# Patient Record
Sex: MALE | Race: WHITE | NOT HISPANIC OR LATINO | Employment: FULL TIME | URBAN - METROPOLITAN AREA
[De-identification: names, ages, dates, MRNs, and addresses within clinical notes are randomized per-mention and may not be internally consistent; named-entity substitution may affect disease eponyms.]

---

## 2023-10-09 ENCOUNTER — OFFICE VISIT (OUTPATIENT)
Dept: PRIMARY CARE | Facility: CLINIC | Age: 28
End: 2023-10-09
Payer: OTHER GOVERNMENT

## 2023-10-09 VITALS
RESPIRATION RATE: 16 BRPM | TEMPERATURE: 98.4 F | HEART RATE: 81 BPM | BODY MASS INDEX: 22.9 KG/M2 | OXYGEN SATURATION: 94 % | DIASTOLIC BLOOD PRESSURE: 75 MMHG | WEIGHT: 151.13 LBS | HEIGHT: 68 IN | SYSTOLIC BLOOD PRESSURE: 134 MMHG

## 2023-10-09 DIAGNOSIS — M62.830 LUMBAR PARASPINAL MUSCLE SPASM: ICD-10-CM

## 2023-10-09 DIAGNOSIS — Z11.4 ENCOUNTER FOR SCREENING FOR HIV: ICD-10-CM

## 2023-10-09 DIAGNOSIS — Z00.00 HEALTHCARE MAINTENANCE: Primary | ICD-10-CM

## 2023-10-09 DIAGNOSIS — Z11.59 ENCOUNTER FOR HEPATITIS C SCREENING TEST FOR LOW RISK PATIENT: ICD-10-CM

## 2023-10-09 PROBLEM — R42 VERTIGO: Status: ACTIVE | Noted: 2017-04-08

## 2023-10-09 PROBLEM — R20.9 SENSATION OF COLD IN FINGER: Status: ACTIVE | Noted: 2023-04-08

## 2023-10-09 PROCEDURE — 99204 OFFICE O/P NEW MOD 45 MIN: CPT | Performed by: STUDENT IN AN ORGANIZED HEALTH CARE EDUCATION/TRAINING PROGRAM

## 2023-10-09 PROCEDURE — 98926 OSTEOPATH MANJ 3-4 REGIONS: CPT | Performed by: STUDENT IN AN ORGANIZED HEALTH CARE EDUCATION/TRAINING PROGRAM

## 2023-10-09 PROCEDURE — 1036F TOBACCO NON-USER: CPT | Performed by: STUDENT IN AN ORGANIZED HEALTH CARE EDUCATION/TRAINING PROGRAM

## 2023-10-09 ASSESSMENT — ENCOUNTER SYMPTOMS
DEPRESSION: 0
LOSS OF SENSATION IN FEET: 0
OCCASIONAL FEELINGS OF UNSTEADINESS: 0

## 2023-10-09 NOTE — PROGRESS NOTES
Subjective   Pascual Márquez is a 28 y.o. male who presents for Vertigo and Spasms (NP here today to establish and to discuss vertigo and back spasms.).  In the [employer] - Traveled throughout [place].  For approx 10 years  Interested in chiropractics after  Moved with [place]    Vertigo - started with depth work in a pool.  Started 2017 - increasing off balanced sensation - with ringing of the ears or head movements  2-10 sec each time  Improved with head stabilization techniques    Back Spasms -intermittently over the past 4 years after having stabbing while running, standing until.  He has had several, exam manipulations with some success.  He does stretching regularly.  He is physically active.   Intermittently takes anti-inflammatories as needed.  He has not gone through formal physical therapy for this.      Patient also states he has some cold sensations in 1 dividual digit, worse with temperature changes.  No numbness, tingling, weakness or motor changes associated.                Review of Systems   Constitutional:  Negative for appetite change, fatigue and fever.   HENT:  Negative for ear discharge, ear pain, hearing loss, postnasal drip, rhinorrhea and sinus pain.    Eyes:  Negative for visual disturbance.   Respiratory:  Negative for shortness of breath.    Cardiovascular:  Negative for chest pain, palpitations and leg swelling.   Gastrointestinal:  Negative for abdominal pain, blood in stool, constipation, diarrhea, nausea and vomiting.   Genitourinary:  Negative for flank pain and hematuria.   Musculoskeletal:  Negative for arthralgias and myalgias.   Skin:  Negative for rash.   Neurological:  Negative for dizziness and light-headedness.   All other systems reviewed and are negative.      Objective   Physical Exam  Vitals reviewed.   Constitutional:       General: He is not in acute distress.     Appearance: Normal appearance. He is normal weight. He is not toxic-appearing.   HENT:      Head:  Normocephalic and atraumatic.      Right Ear: Tympanic membrane and ear canal normal.      Left Ear: Tympanic membrane and ear canal normal.      Nose: Nose normal. No congestion or rhinorrhea.      Mouth/Throat:      Mouth: Mucous membranes are dry.   Eyes:      General: No scleral icterus.     Extraocular Movements: Extraocular movements intact.      Conjunctiva/sclera: Conjunctivae normal.      Pupils: Pupils are equal, round, and reactive to light.   Cardiovascular:      Rate and Rhythm: Normal rate and regular rhythm.      Heart sounds: No murmur heard.     No friction rub. No gallop.   Pulmonary:      Effort: Pulmonary effort is normal. No respiratory distress.      Breath sounds: Normal breath sounds. No wheezing, rhonchi or rales.   Abdominal:      General: Abdomen is flat. There is no distension.      Palpations: Abdomen is soft.      Tenderness: There is no abdominal tenderness. There is no guarding.   Musculoskeletal:         General: Normal range of motion.      Cervical back: Normal range of motion and neck supple.      Right lower leg: No edema.      Left lower leg: No edema.      Comments: Significant limited range of motion in hamstrings, hips, and lumbar spine   Lymphadenopathy:      Cervical: No cervical adenopathy.   Skin:     General: Skin is warm and dry.   Neurological:      General: No focal deficit present.      Mental Status: He is alert and oriented to person, place, and time.   Psychiatric:         Mood and Affect: Mood normal.         Behavior: Behavior normal.         Assessment/Plan   Problem List Items Addressed This Visit    None  Visit Diagnoses       Healthcare maintenance    -  Primary    Relevant Orders    Comprehensive Metabolic Panel    CBC    Lipid Panel    Encounter for hepatitis C screening test for low risk patient        Relevant Orders    Hepatitis C Antibody    Encounter for screening for HIV        Relevant Orders    HIV 1/2 Antigen/Antibody Screen with Reflex to  Confirmation          Patient seen today to establish care    We will get baseline lab work.    Overall lives at the lifestyle, lives with fiancé.    For muscle spasms of the low back performed Acimethin manipulation, this included lumbar spine, sacrum, pelvis, hamstrings, bilaterally.    Osteopathic manipulation of the above areas included muscle energy, stills technique.  Tolerated well, significant improvement flexibility following.    Due to patient's vertigo, given handout on home Epley maneuver.    Follow-up as new concerns arise.

## 2023-10-10 ASSESSMENT — ENCOUNTER SYMPTOMS
LIGHT-HEADEDNESS: 0
DIARRHEA: 0
FLANK PAIN: 0
BLOOD IN STOOL: 0
MYALGIAS: 0
DIZZINESS: 0
NAUSEA: 0
PALPITATIONS: 0
ARTHRALGIAS: 0
ABDOMINAL PAIN: 0
SINUS PAIN: 0
FEVER: 0
SHORTNESS OF BREATH: 0
HEMATURIA: 0
CONSTIPATION: 0
VOMITING: 0
RHINORRHEA: 0
FATIGUE: 0
APPETITE CHANGE: 0

## 2023-10-16 ENCOUNTER — LAB (OUTPATIENT)
Dept: LAB | Facility: LAB | Age: 28
End: 2023-10-16
Payer: OTHER GOVERNMENT

## 2023-10-16 DIAGNOSIS — Z11.4 ENCOUNTER FOR SCREENING FOR HIV: ICD-10-CM

## 2023-10-16 DIAGNOSIS — Z00.00 HEALTHCARE MAINTENANCE: ICD-10-CM

## 2023-10-16 DIAGNOSIS — Z11.59 ENCOUNTER FOR HEPATITIS C SCREENING TEST FOR LOW RISK PATIENT: ICD-10-CM

## 2023-10-16 LAB
ALBUMIN SERPL BCP-MCNC: 4.9 G/DL (ref 3.4–5)
ALP SERPL-CCNC: 34 U/L (ref 33–120)
ALT SERPL W P-5'-P-CCNC: 13 U/L (ref 10–52)
ANION GAP SERPL CALC-SCNC: 9 MMOL/L (ref 10–20)
AST SERPL W P-5'-P-CCNC: 16 U/L (ref 9–39)
BILIRUB SERPL-MCNC: 1.6 MG/DL (ref 0–1.2)
BUN SERPL-MCNC: 18 MG/DL (ref 6–23)
CALCIUM SERPL-MCNC: 10 MG/DL (ref 8.6–10.3)
CHLORIDE SERPL-SCNC: 101 MMOL/L (ref 98–107)
CHOLEST SERPL-MCNC: 139 MG/DL (ref 0–199)
CHOLESTEROL/HDL RATIO: 2.9
CO2 SERPL-SCNC: 32 MMOL/L (ref 21–32)
CREAT SERPL-MCNC: 1.09 MG/DL (ref 0.5–1.3)
ERYTHROCYTE [DISTWIDTH] IN BLOOD BY AUTOMATED COUNT: 12.4 % (ref 11.5–14.5)
GFR SERPL CREATININE-BSD FRML MDRD: >90 ML/MIN/1.73M*2
GLUCOSE SERPL-MCNC: 86 MG/DL (ref 74–99)
HCT VFR BLD AUTO: 44.9 % (ref 41–52)
HDLC SERPL-MCNC: 47.9 MG/DL
HGB BLD-MCNC: 15 G/DL (ref 13.5–17.5)
HIV 1+2 AB+HIV1 P24 AG SERPL QL IA: NONREACTIVE
LDLC SERPL CALC-MCNC: 82 MG/DL
MCH RBC QN AUTO: 29 PG (ref 26–34)
MCHC RBC AUTO-ENTMCNC: 33.4 G/DL (ref 32–36)
MCV RBC AUTO: 87 FL (ref 80–100)
NON HDL CHOLESTEROL: 91 MG/DL (ref 0–149)
NRBC BLD-RTO: 0 /100 WBCS (ref 0–0)
PLATELET # BLD AUTO: 264 X10*3/UL (ref 150–450)
PMV BLD AUTO: 10.8 FL (ref 7.5–11.5)
POTASSIUM SERPL-SCNC: 4.6 MMOL/L (ref 3.5–5.3)
PROT SERPL-MCNC: 7.8 G/DL (ref 6.4–8.2)
RBC # BLD AUTO: 5.17 X10*6/UL (ref 4.5–5.9)
SODIUM SERPL-SCNC: 137 MMOL/L (ref 136–145)
TRIGL SERPL-MCNC: 48 MG/DL (ref 0–149)
VLDL: 10 MG/DL (ref 0–40)
WBC # BLD AUTO: 5.2 X10*3/UL (ref 4.4–11.3)

## 2023-10-16 PROCEDURE — 36415 COLL VENOUS BLD VENIPUNCTURE: CPT

## 2023-10-16 PROCEDURE — 80061 LIPID PANEL: CPT

## 2023-10-16 PROCEDURE — 86803 HEPATITIS C AB TEST: CPT

## 2023-10-16 PROCEDURE — 85027 COMPLETE CBC AUTOMATED: CPT

## 2023-10-16 PROCEDURE — 80053 COMPREHEN METABOLIC PANEL: CPT

## 2023-10-16 PROCEDURE — 87389 HIV-1 AG W/HIV-1&-2 AB AG IA: CPT

## 2023-10-17 LAB — HCV AB SER QL: NONREACTIVE

## 2023-11-15 ENCOUNTER — LAB (OUTPATIENT)
Dept: LAB | Facility: LAB | Age: 28
End: 2023-11-15
Payer: OTHER GOVERNMENT

## 2023-11-15 ENCOUNTER — OFFICE VISIT (OUTPATIENT)
Dept: PRIMARY CARE | Facility: CLINIC | Age: 28
End: 2023-11-15
Payer: OTHER GOVERNMENT

## 2023-11-15 VITALS
WEIGHT: 150 LBS | TEMPERATURE: 98.6 F | SYSTOLIC BLOOD PRESSURE: 121 MMHG | HEIGHT: 68 IN | RESPIRATION RATE: 18 BRPM | BODY MASS INDEX: 22.73 KG/M2 | DIASTOLIC BLOOD PRESSURE: 74 MMHG | OXYGEN SATURATION: 96 % | HEART RATE: 79 BPM

## 2023-11-15 DIAGNOSIS — Z13.88 SCREENING FOR LEAD EXPOSURE: Primary | ICD-10-CM

## 2023-11-15 DIAGNOSIS — I73.00 RAYNAUD'S PHENOMENON WITHOUT GANGRENE: ICD-10-CM

## 2023-11-15 DIAGNOSIS — Z13.88 SCREENING FOR LEAD EXPOSURE: ICD-10-CM

## 2023-11-15 DIAGNOSIS — R20.9 SENSATION OF COLD IN FINGER: ICD-10-CM

## 2023-11-15 LAB
APPEARANCE UR: CLEAR
BILIRUB UR STRIP.AUTO-MCNC: NEGATIVE MG/DL
COLOR UR: YELLOW
GLUCOSE UR STRIP.AUTO-MCNC: NEGATIVE MG/DL
KETONES UR STRIP.AUTO-MCNC: NEGATIVE MG/DL
LEUKOCYTE ESTERASE UR QL STRIP.AUTO: NEGATIVE
NITRITE UR QL STRIP.AUTO: NEGATIVE
PH UR STRIP.AUTO: 7 [PH]
PROT UR STRIP.AUTO-MCNC: NEGATIVE MG/DL
RBC # UR STRIP.AUTO: NEGATIVE /UL
SP GR UR STRIP.AUTO: 1.02
UROBILINOGEN UR STRIP.AUTO-MCNC: <2 MG/DL

## 2023-11-15 PROCEDURE — 99213 OFFICE O/P EST LOW 20 MIN: CPT

## 2023-11-15 PROCEDURE — 84202 ASSAY RBC PROTOPORPHYRIN: CPT

## 2023-11-15 PROCEDURE — 1036F TOBACCO NON-USER: CPT

## 2023-11-15 PROCEDURE — 81003 URINALYSIS AUTO W/O SCOPE: CPT

## 2023-11-15 PROCEDURE — 36415 COLL VENOUS BLD VENIPUNCTURE: CPT

## 2023-11-15 PROCEDURE — 83655 ASSAY OF LEAD: CPT

## 2023-11-15 ASSESSMENT — ENCOUNTER SYMPTOMS
COLOR CHANGE: 1
VOMITING: 0
DIZZINESS: 0
LIGHT-HEADEDNESS: 0
FEVER: 0
NAUSEA: 0
SHORTNESS OF BREATH: 0
NUMBNESS: 1

## 2023-11-15 NOTE — PROGRESS NOTES
Subjective   Pascual Márquez is a 28 y.o. male who presents for Numbness (He has left index finger numbness that has been happening x 1 year. Recently the finger became numb and became white and there was a significant temperature difference with that finger and the other fingers.).  Pt has noticed numbness and cooling of his index finger. He was told as long as there is no change in color not to worry but he noticed complete pallor of the affected digit. This was accompanied with numbness. He has had this going on for about 1 year. 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 Denies any radiation of the numbness. The pallor is not radiating any further than his DIP/PIP.     Pt also is requiring blood test for lead exposure through work since he is around lead based equipment a lot through the [employer]. They are requiring blood lead, ZPP, CBC, BUN/Cr, UA with microscopic. CBC and BUN/Cr checked last month during physical. Will order blodo lead, UA with microscopic and ZPP    Neuropathy          Review of Systems   Constitutional:  Negative for fever.   Respiratory:  Negative for shortness of breath.    Cardiovascular:  Negative for chest pain.   Gastrointestinal:  Negative for nausea and vomiting.   Skin:  Positive for color change and pallor.   Neurological:  Positive for numbness. Negative for dizziness and light-headedness.   All other systems reviewed and are negative.      Objective   Physical Exam  Vitals reviewed.   Constitutional:       General: He is not in acute distress.     Appearance: Normal appearance. He is not toxic-appearing.   HENT:      Head: Normocephalic and atraumatic.      Nose: Nose normal.   Eyes:      Extraocular Movements: Extraocular movements intact.   Cardiovascular:      Rate and Rhythm: Normal rate.      Heart sounds: No murmur heard.     No friction rub. No gallop.   Pulmonary:       "Effort: Pulmonary effort is normal.   Skin:     General: Skin is warm and dry.      Coloration: Skin is pale.      Comments: Poor capillary refill appreciated of L index finger, sensation intact   Neurological:      General: No focal deficit present.      Mental Status: He is alert.   Psychiatric:         Mood and Affect: Mood normal.         Behavior: Behavior normal.         Assessment/Plan   Problem List Items Addressed This Visit             ICD-10-CM    Sensation of cold in finger R20.9     Pt presenting for continued feeling of numbness, cold, pallor in L index finger. Pt noticed that finger gets pale and hurts when \"blood returns\". 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 Pt agreeable to doppler at this time as source is likely vascular. Unusual for raynaud's to present in one single digit however remains on ddx. Pt to follow up for results. Can consider CCB if needed. Encouraged to exercise with hand motions and can trial raynaud's gloves.          Screening for lead exposure - Primary Z13.88     Pt req lead screening for work. CBC and BUN/Cr already done at last appointment. Urine with microscopic, lead blood, and ZPP ordered today.         Relevant Orders    Lead, blood    Zinc Protoporphyrin (ZPP)    Urinalysis with Reflex Microscopic     Other Visit Diagnoses         Codes    Raynaud's phenomenon without gangrene     I73.00    Relevant Orders    Vascular US upper extremity arterial duplex left               "

## 2023-11-15 NOTE — ASSESSMENT & PLAN NOTE
Pt req lead screening for work. CBC and BUN/Cr already done at last appointment. Urine with microscopic, lead blood, and ZPP ordered today.

## 2023-11-15 NOTE — ASSESSMENT & PLAN NOTE
"Pt presenting for continued feeling of numbness, cold, pallor in L index finger. Pt noticed that finger gets pale and hurts when \"blood returns\". 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 Pt agreeable to doppler at this time as source is likely vascular. Unusual for raynaud's to present in one single digit however remains on ddx. Pt to follow up for results. Can consider CCB if needed. Encouraged to exercise with hand motions and can trial raynaud's gloves.   "

## 2023-11-16 LAB — LEAD BLD-MCNC: <0.5 UG/DL

## 2023-11-17 NOTE — PROGRESS NOTES
I saw and evaluated the patient. I personally obtained the key and critical portions of the history and physical exam or was physically present for key and critical portions performed by the resident/fellow. I reviewed the resident/fellow's documentation and discussed the patient with the resident/fellow. I agree with the resident/fellow's medical decision making as documented in the note.    Torres Solomon, DO

## 2023-11-20 LAB — ZPP RBC-MCNC: 20 UG/DL (ref 0–36)

## 2023-11-21 ENCOUNTER — TELEPHONE (OUTPATIENT)
Dept: PRIMARY CARE | Facility: CLINIC | Age: 28
End: 2023-11-21
Payer: OTHER GOVERNMENT

## 2023-11-21 DIAGNOSIS — R20.9 SENSATION OF COLD IN FINGER: Primary | ICD-10-CM

## 2023-11-28 ENCOUNTER — APPOINTMENT (OUTPATIENT)
Dept: RADIOLOGY | Facility: CLINIC | Age: 28
End: 2023-11-28
Payer: OTHER GOVERNMENT

## 2023-12-07 ENCOUNTER — HOSPITAL ENCOUNTER (OUTPATIENT)
Dept: VASCULAR MEDICINE | Facility: CLINIC | Age: 28
Discharge: HOME | End: 2023-12-07
Payer: OTHER GOVERNMENT

## 2023-12-07 DIAGNOSIS — R09.89 OTHER SPECIFIED SYMPTOMS AND SIGNS INVOLVING THE CIRCULATORY AND RESPIRATORY SYSTEMS: ICD-10-CM

## 2023-12-07 DIAGNOSIS — R20.9 SENSATION OF COLD IN FINGER: ICD-10-CM

## 2023-12-07 PROCEDURE — 93922 UPR/L XTREMITY ART 2 LEVELS: CPT | Performed by: SURGERY

## 2023-12-07 PROCEDURE — 93930 UPPER EXTREMITY STUDY: CPT | Performed by: SURGERY

## 2023-12-07 PROCEDURE — 93922 UPR/L XTREMITY ART 2 LEVELS: CPT

## 2023-12-08 DIAGNOSIS — I73.00 RAYNAUD'S PHENOMENON WITHOUT GANGRENE: Primary | ICD-10-CM

## 2023-12-12 ENCOUNTER — TELEPHONE (OUTPATIENT)
Dept: PRIMARY CARE | Facility: CLINIC | Age: 28
End: 2023-12-12
Payer: OTHER GOVERNMENT

## 2023-12-29 PROBLEM — M54.50 LOW BACK PAIN: Status: ACTIVE | Noted: 2018-01-13

## 2024-01-05 ENCOUNTER — TELEPHONE (OUTPATIENT)
Dept: PRIMARY CARE | Facility: CLINIC | Age: 29
End: 2024-01-05
Payer: OTHER GOVERNMENT

## 2024-01-05 DIAGNOSIS — H16.209 KERATOCONJUNCTIVITIS, UNSPECIFIED LATERALITY: Primary | ICD-10-CM

## 2024-01-17 ENCOUNTER — OFFICE VISIT (OUTPATIENT)
Dept: VASCULAR SURGERY | Facility: CLINIC | Age: 29
End: 2024-01-17
Payer: OTHER GOVERNMENT

## 2024-01-17 VITALS
BODY MASS INDEX: 22.73 KG/M2 | WEIGHT: 150 LBS | DIASTOLIC BLOOD PRESSURE: 70 MMHG | SYSTOLIC BLOOD PRESSURE: 120 MMHG | HEIGHT: 68 IN | HEART RATE: 74 BPM

## 2024-01-17 DIAGNOSIS — I73.00 RAYNAUD'S SYNDROME WITHOUT GANGRENE: Primary | ICD-10-CM

## 2024-01-17 DIAGNOSIS — I73.00 RAYNAUD'S PHENOMENON WITHOUT GANGRENE: ICD-10-CM

## 2024-01-17 PROCEDURE — 99203 OFFICE O/P NEW LOW 30 MIN: CPT | Performed by: SURGERY

## 2024-01-17 PROCEDURE — 1036F TOBACCO NON-USER: CPT | Performed by: SURGERY

## 2024-01-17 RX ORDER — AMLODIPINE BESYLATE 5 MG/1
5 TABLET ORAL DAILY
Qty: 30 TABLET | Refills: 3 | Status: SHIPPED | OUTPATIENT
Start: 2024-01-17 | End: 2024-02-27 | Stop reason: ALTCHOICE

## 2024-01-17 ASSESSMENT — ENCOUNTER SYMPTOMS
RESPIRATORY NEGATIVE: 1
NUMBNESS: 1
ALLERGIC/IMMUNOLOGIC NEGATIVE: 1
GASTROINTESTINAL NEGATIVE: 1
COLOR CHANGE: 1
PSYCHIATRIC NEGATIVE: 1
ENDOCRINE NEGATIVE: 1
CONSTITUTIONAL NEGATIVE: 1
MUSCULOSKELETAL NEGATIVE: 1
CARDIOVASCULAR NEGATIVE: 1
HEMATOLOGIC/LYMPHATIC NEGATIVE: 1

## 2024-01-17 NOTE — PROGRESS NOTES
History Of Present Illness  Pascual Márquez is a 28 y.o. male presenting with complaint of discoloration, pain, numbness and tingling of fingers bilaterally.  He states this has been a problem for over a year.  He does notice that it gets worse in colder temperatures.  He states the only things that helps are warming up his hands.  He was evaluated by his PCP and sent for Doppler studies.  This does reveal findings consistent with Raynaud's syndrome with a decrease in waveforms with cold immersion.     Past Medical History  He has no past medical history on file.    Surgical History  He has no past surgical history on file.     Social History  He reports that he has never smoked. He has never used smokeless tobacco. No history on file for alcohol use and drug use.    Family History  No family history on file.     Allergies  Patient has no known allergies.    Review of Systems   Constitutional: Negative.    HENT: Negative.     Respiratory: Negative.     Cardiovascular: Negative.    Gastrointestinal: Negative.    Endocrine: Negative.    Genitourinary: Negative.    Musculoskeletal: Negative.    Skin:  Positive for color change.   Allergic/Immunologic: Negative.    Neurological:  Positive for numbness.   Hematological: Negative.    Psychiatric/Behavioral: Negative.          Physical Exam  Vitals reviewed.   Constitutional:       General: He is not in acute distress.     Appearance: Normal appearance. He is normal weight.   HENT:      Head: Normocephalic and atraumatic.   Eyes:      Extraocular Movements: Extraocular movements intact.      Conjunctiva/sclera: Conjunctivae normal.      Pupils: Pupils are equal, round, and reactive to light.   Neck:      Vascular: No carotid bruit.   Cardiovascular:      Rate and Rhythm: Normal rate and regular rhythm.      Pulses: Normal pulses.           Radial pulses are 2+ on the right side and 2+ on the left side.      Heart sounds: Normal heart sounds.      Comments: Triphasic  "signals noted in radial, ulnar, and palmar arch bilaterally  Pulmonary:      Effort: Pulmonary effort is normal.      Breath sounds: Normal breath sounds.   Abdominal:      General: Abdomen is flat. Bowel sounds are normal.      Palpations: Abdomen is soft.   Musculoskeletal:         General: No swelling or tenderness. Normal range of motion.      Cervical back: Normal range of motion and neck supple. No tenderness.   Skin:     General: Skin is warm and dry.      Capillary Refill: Capillary refill takes 2 to 3 seconds.      Comments: Slightly delayed capillary refill in fingers bilaterally   Neurological:      General: No focal deficit present.      Mental Status: He is alert and oriented to person, place, and time.      Cranial Nerves: No cranial nerve deficit.      Sensory: No sensory deficit.      Motor: No weakness.   Psychiatric:         Mood and Affect: Mood normal.         Behavior: Behavior normal.          Last Recorded Vitals  Blood pressure 120/70, pulse 74, height 1.727 m (5' 8\"), weight 68 kg (150 lb).    Relevant Results      Current Outpatient Medications:     amLODIPine (Norvasc) 5 mg tablet, Take 1 tablet (5 mg) by mouth once daily., Disp: 30 tablet, Rfl: 3           Assessment/Plan   Diagnoses and all orders for this visit:  Raynaud's syndrome without gangrene  -     amLODIPine (Norvasc) 5 mg tablet; Take 1 tablet (5 mg) by mouth once daily.  -     Referral to Vascular Medicine; Future  Raynaud's phenomenon without gangrene  -     Referral to Vascular Surgery      27yo male with Raynaud's syndrome of bilateral upper extremities.  I have started him on amlodipine 5 mg daily to help with symptoms.  I have also recommended that he continue to keep his hands warm and minimize his exposure to cold.  I have referred him to Dr. Waldron of vascular medicine for further evaluation and treatment.       I spent 30 minutes in the professional and overall care of this patient.      Estefania White MD   "

## 2024-02-14 ENCOUNTER — CONSULT (OUTPATIENT)
Dept: CARDIOLOGY | Facility: CLINIC | Age: 29
End: 2024-02-14
Payer: OTHER GOVERNMENT

## 2024-02-14 VITALS
HEIGHT: 68 IN | BODY MASS INDEX: 23.04 KG/M2 | OXYGEN SATURATION: 97 % | DIASTOLIC BLOOD PRESSURE: 62 MMHG | HEART RATE: 88 BPM | WEIGHT: 152 LBS | SYSTOLIC BLOOD PRESSURE: 142 MMHG

## 2024-02-14 DIAGNOSIS — I73.9: Primary | ICD-10-CM

## 2024-02-14 DIAGNOSIS — I73.00 RAYNAUD'S SYNDROME WITHOUT GANGRENE: ICD-10-CM

## 2024-02-14 PROCEDURE — 99203 OFFICE O/P NEW LOW 30 MIN: CPT | Performed by: INTERNAL MEDICINE

## 2024-02-14 RX ORDER — AMLODIPINE BESYLATE 2.5 MG/1
2.5 TABLET ORAL DAILY
Qty: 30 TABLET | Refills: 11 | Status: SHIPPED | OUTPATIENT
Start: 2024-02-14 | End: 2025-02-13

## 2024-02-14 ASSESSMENT — ENCOUNTER SYMPTOMS
CONSTITUTIONAL NEGATIVE: 1
CARDIOVASCULAR NEGATIVE: 1
HEMATOLOGIC/LYMPHATIC NEGATIVE: 1
MUSCULOSKELETAL NEGATIVE: 1
GASTROINTESTINAL NEGATIVE: 1
RESPIRATORY NEGATIVE: 1
COLOR CHANGE: 1
NUMBNESS: 1
PSYCHIATRIC NEGATIVE: 1
EYES NEGATIVE: 1

## 2024-02-14 NOTE — PROGRESS NOTES
"Subjective   Patient ID: Pascual Márquez is a 29 y.o. male who presents for NPV    HPI   28 y.o. male presenting with complaint of discoloration, pain, numbness and tingling of fingers bilaterally.  He states this has been a problem for over a year.  He does notice that it gets worse in colder temperatures.  He states the only things that helps are warming up his hands.  He was evaluated by his PCP and sent for Doppler studies.  This does reveal findings consistent with Raynaud's syndrome with a decrease in waveforms with cold immersion.   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 His symptoms are primarily pronounced in one finger that he uses to operate the weapon. Patient and family recently moved from [place] to [place] and his sx have been worsening in colder temperatures here.   Patient was started on Norvasc 5mg by Dr White which he has not started taking because of concerns regarding side effects.   No fhx of Raynaud's. He is a non-smoker, drinks alcohol only occasionally.     Review of Systems   Constitutional: Negative.    HENT: Negative.     Eyes: Negative.    Respiratory: Negative.     Cardiovascular: Negative.    Gastrointestinal: Negative.    Musculoskeletal: Negative.    Skin:  Positive for color change.   Neurological:  Positive for numbness.   Hematological: Negative.    Psychiatric/Behavioral: Negative.     All other systems reviewed and are negative.      Objective   /62 (BP Location: Right arm, Patient Position: Sitting)   Pulse 88   Ht 1.727 m (5' 8\")   Wt 68.9 kg (152 lb)   SpO2 97%   BMI 23.11 kg/m²     Physical Exam  Vitals reviewed.   Constitutional:       Appearance: Normal appearance.   HENT:      Head: Normocephalic and atraumatic.      Nose: Nose normal.   Cardiovascular:      Rate and Rhythm: Normal rate and regular rhythm.   Pulmonary:      Effort: Pulmonary effort is normal.      Breath sounds: Normal " "breath sounds.   Musculoskeletal:         General: Normal range of motion.   Skin:     Capillary Refill: Capillary refill takes 2 to 3 seconds.   Neurological:      General: No focal deficit present.      Mental Status: He is alert and oriented to person, place, and time.   Psychiatric:         Mood and Affect: Mood normal.         Behavior: Behavior normal.         Assessment/Plan   Diagnoses and all orders for this visit:  Peripheral artery vasospasm (CMS/HCC)  -     amLODIPine (Norvasc) 2.5 mg tablet; Take 1 tablet (2.5 mg) by mouth once daily.  Raynaud's syndrome without gangrene  -     Referral to Vascular Medicine     29yo male with Raynaud's syndrome of bilateral upper extremities. He presents with complaint of discoloration, pain, numbness and tingling of fingers bilaterally. Patient frequently uses high powered weapons such as pistols and rifles involving using the trigger repeatedly for his work in law enforcement and his symptoms are consistent with traumatic vasospastic disease leading to \"white finger syndrome\" in the left hand index finger.     1- Avoid exposure to cold temperatures and refrain from handling high powdered weapons as this could worsen or trigger symptoms  2- Take Norvasc 2.5mg p.o nightly  3- follow up prn for assessment    Scribe Attestation  By signing my name below, IDevi , Scribe   attest that this documentation has been prepared under the direction and in the presence of Clifford Waldron MD.      "

## 2024-02-23 ENCOUNTER — OFFICE VISIT (OUTPATIENT)
Dept: OPHTHALMOLOGY | Facility: CLINIC | Age: 29
End: 2024-02-23
Payer: OTHER GOVERNMENT

## 2024-02-23 DIAGNOSIS — H18.603 KERATOCONUS OF BOTH EYES: Primary | ICD-10-CM

## 2024-02-23 DIAGNOSIS — G47.33 OSA (OBSTRUCTIVE SLEEP APNEA): ICD-10-CM

## 2024-02-23 LAB
KMAX (OD): 61.4 DIOPTERS
KMAX (OS): 47.7 DIOPTERS
PACH THINNEST (OD): 414 MICRONS
PACH THINNEST (OS): 473 MICRONS
SIMK FLAT (OD): 47 DIOPTERS
SIMK FLAT (OS): 44.8 DIOPTERS
SIMK STEEP (OD): 53 DIOPTERS
SIMK STEEP (OS): 46.4 DIOPTERS

## 2024-02-23 PROCEDURE — 92134 CPTRZ OPH DX IMG PST SGM RTA: CPT | Performed by: OPHTHALMOLOGY

## 2024-02-23 PROCEDURE — 99203 OFFICE O/P NEW LOW 30 MIN: CPT | Performed by: OPHTHALMOLOGY

## 2024-02-23 PROCEDURE — 92025 CPTRIZED CORNEAL TOPOGRAPHY: CPT | Performed by: OPHTHALMOLOGY

## 2024-02-23 ASSESSMENT — REFRACTION_MANIFEST
OD_SPHERE: -1.25
OD_AXIS: 180
OD_AXIS: 180
OS_SPHERE: +0.75
OD_CYLINDER: -5.50
OS_AXIS: 025
OS_CYLINDER: -1.75
OS_CYLINDER: -1.50
OS_AXIS: 025
OD_CYLINDER: -5.50
OD_SPHERE: -1.75
OS_SPHERE: +0.75

## 2024-02-23 ASSESSMENT — VISUAL ACUITY
OS_CC: 20/20-1
OD_PH_CC: 20/20-1
METHOD: SNELLEN - LINEAR
OD_CC: 20/30+2

## 2024-02-23 ASSESSMENT — REFRACTION_WEARINGRX
OS_SPHERE: +0.50
OS_CYLINDER: -0.25
OS_AXIS: 014
OD_CYLINDER: -2.75
OD_SPHERE: PLANO
OD_AXIS: 047

## 2024-02-23 ASSESSMENT — TONOMETRY
IOP_METHOD: GOLDMANN APPLANATION
OS_IOP_MMHG: 12
OD_IOP_MMHG: 12

## 2024-02-23 NOTE — PROGRESS NOTES
Assessment/Plan   Diagnoses and all orders for this visit:  Keratoconus of both eyes  -Discussed the risk of progression and counseled against eye rubbing as this may lead to progression  -Discused corneal cross linking as a measure to prevent further progression of KCN  -Discussed the postop course, fluctuation of vision and the need for vision correction after CXL best with CL  -Discussed r/b/a of CXL including infection and haze.   -The patient understands and wishes to proceed  - Explained to the patient at length how CXL is important to decrease the risk of needing a corneal transplant later in life  - Explained that CXL will not improve vision  - Answered all pt questions  -Will schedule CXL (right eye)  - Will monitor OS for now  -     Corneal Topography - OU - Both Eyes  SCOT (obstructive sleep apnea)  -     Referral to Adult Sleep Medicine; Future

## 2024-02-23 NOTE — LETTER
February 23, 2024    Torres Solomon DO  [address]  [facility]  [facility] [address]    Patient: Pascual Márquez   YOB: 1995   Date of Visit: 2/23/2024       Dear Dr. Torres Solomon DO:    Thank you for referring Pascual Márquez to me for evaluation. Below are the relevant portions of the exam, along with my assessment and plan of care.    Vision readings for this visit:  Visual Acuity (Snellen - Linear)         Right Left    Dist cc 20/30+2 20/20-1    Dist ph cc 20/20-1           Tonometry (Goldmann Applanation, 9:51 AM)         Right Left    Pressure 12 12            Assessment/Plan:  Diagnoses and all orders for this visit:  Keratoconus of both eyes  -Discussed the risk of progression and counseled against eye rubbing as this may lead to progression  -Discused corneal cross linking as a measure to prevent further progression of KCN  -Discussed the postop course, fluctuation of vision and the need for vision correction after CXL best with CL  -Discussed r/b/a of CXL including infection and haze.   -The patient understands and wishes to proceed  - Explained to the patient at length how CXL is important to decrease the risk of needing a corneal transplant later in life  - Explained that CXL will not improve vision  - Answered all pt questions  -Will schedule CXL (right eye)  - Will monitor OS for now  -     Corneal Topography - OU - Both Eyes  SCOT (obstructive sleep apnea)  -     Referral to Adult Sleep Medicine; Future        If you have questions, please do not hesitate to call me. I look forward to following Pascual along with you.         Sincerely,        Barbie Salmeron MD        CC:   No Recipients

## 2024-02-27 ENCOUNTER — TELEPHONE (OUTPATIENT)
Dept: PRIMARY CARE | Facility: CLINIC | Age: 29
End: 2024-02-27
Payer: OTHER GOVERNMENT

## 2024-02-27 DIAGNOSIS — R29.818 SUSPECTED SLEEP APNEA: Primary | ICD-10-CM

## 2024-04-23 ENCOUNTER — OFFICE VISIT (OUTPATIENT)
Dept: SLEEP MEDICINE | Facility: HOSPITAL | Age: 29
End: 2024-04-23
Payer: OTHER GOVERNMENT

## 2024-04-23 VITALS
BODY MASS INDEX: 22.5 KG/M2 | SYSTOLIC BLOOD PRESSURE: 111 MMHG | OXYGEN SATURATION: 95 % | HEART RATE: 78 BPM | TEMPERATURE: 98.2 F | DIASTOLIC BLOOD PRESSURE: 74 MMHG | WEIGHT: 148 LBS

## 2024-04-23 DIAGNOSIS — G47.10 HYPERSOMNOLENCE: ICD-10-CM

## 2024-04-23 DIAGNOSIS — R06.83 SNORING: ICD-10-CM

## 2024-04-23 DIAGNOSIS — R29.818 SUSPECTED SLEEP APNEA: Primary | ICD-10-CM

## 2024-04-23 PROCEDURE — 99204 OFFICE O/P NEW MOD 45 MIN: CPT | Performed by: INTERNAL MEDICINE

## 2024-04-23 PROCEDURE — 99214 OFFICE O/P EST MOD 30 MIN: CPT | Mod: GC | Performed by: INTERNAL MEDICINE

## 2024-04-23 SDOH — ECONOMIC STABILITY: FOOD INSECURITY: WITHIN THE PAST 12 MONTHS, YOU WORRIED THAT YOUR FOOD WOULD RUN OUT BEFORE YOU GOT MONEY TO BUY MORE.: NEVER TRUE

## 2024-04-23 SDOH — ECONOMIC STABILITY: FOOD INSECURITY: WITHIN THE PAST 12 MONTHS, THE FOOD YOU BOUGHT JUST DIDN'T LAST AND YOU DIDN'T HAVE MONEY TO GET MORE.: NEVER TRUE

## 2024-04-23 ASSESSMENT — PATIENT HEALTH QUESTIONNAIRE - PHQ9
2. FEELING DOWN, DEPRESSED OR HOPELESS: NOT AT ALL
SUM OF ALL RESPONSES TO PHQ9 QUESTIONS 1 & 2: 0
1. LITTLE INTEREST OR PLEASURE IN DOING THINGS: NOT AT ALL

## 2024-04-23 ASSESSMENT — LIFESTYLE VARIABLES: HOW OFTEN DO YOU HAVE A DRINK CONTAINING ALCOHOL: MONTHLY OR LESS

## 2024-04-23 ASSESSMENT — PAIN SCALES - GENERAL: PAINLEVEL: 0-NO PAIN

## 2024-04-23 NOTE — PATIENT INSTRUCTIONS
[facility] Sleep Medicine  [facility]  [address]  [facility] [address]  [phone]       NAME: Pascual Márquez   DATE: 4/23/2024     Your Sleep Provider Today: Calin Li MD  Your Primary Care Physician: Torres Solomon DO   Your Referring Provider: Torres Solomon DO    DIAGNOSIS:   1. Suspected sleep apnea  Referral to Adult Sleep Medicine      2. Snoring        3. Hypersomnolence            Thank you for coming to the Sleep Medicine Clinic today! Your sleep medicine provider today was: Calin Li MD Below is a summary of your treatment plan, other important information, and our contact numbers:      TREATMENT PLAN     Please schedule your sleep studies  For the daytime test a routine drug screen will be done, please have that done prior to your test  Please fill out the sleep diaries before your testing as well so we can look at your sleep patterns  We will call you after we get the results  If you have any questions you can reach out on mGaadi    Thanks,  Dr. Bev Claudio      IMPORTANT INFORMATION     Call 911 for medical emergencies.  Our offices are generally open from Monday-Friday, 9 am - 5 pm.  If you need to get in touch with me, you may either call me and my team(number is below) or you can use mGaadi.  If a referral for a test, for CPAP, or for another specialist was made, and you have not heard about scheduling this within a week, please call scheduling at [phone]).  If you are unable to make your appointment for clinic or an overnight study, kindly call the office at least 48 hours in advance to cancel and reschedule.  If you are on CPAP, please bring your device's card or the device to each clinic appointment.   There are no supporting services by either the sleep doctors or their staff on weekends and Holidays, or after 5 PM on weekdays.   If you have been asked to come to a sleep study, make sure you  bring toiletries, a comfy pillow, and any nighttime medications that you may regularly take. Also be sure to eat dinner before you arrive. We generally do not provide meals.      PRESCRIPTIONS     We require 7 days advanced notice for prescription refills. If we do not receive the request in this time, we cannot guarantee that your medication will be refilled in time.      IMPORTANT PHONE NUMBERS     Sleep Medicine Clinic Fax: [phone]  Appointments (for Pediatric Sleep Clinic): [phone] - option 1  Appointments (for Adult Sleep Clinic): [phone] - option 2  Appointments (For Sleep Studies): [phone] - option 3  Behavioral Sleep Medicine: [phone]  Sleep Surgery: [phone]  ENT (Otolaryngology): [phone]  Headache Clinic (Neurology): [phone]  Neurology: [phone]  Psychiatry: [phone]  Pulmonary Function Testing (PFT) [place]: [phone]  Pulmonary Medicine: [phone]  [facility] (DME): [phone]  [facility] (DME): [phone]  [facility] (DME): [phone]      OUR ADULT SLEEP MEDICINE TEAM   Please do not hesitate to call the office or sleep nurse with any questions between appointments:    Adult Sleep Nurses (Emma Vernon, RN and Monica Lopez RN):  For clinical questions and refilling prescriptions: [phone]  Email sleep diaries and other documents at: [email or web address]    Adult Sleep Medicine Secretaries:  Radha Rahman (For Jen/Good/Krise/Strohl/Yeh/Meyer):   P: [phone]  F: [phone]  Kavya Sahu (For Mehta/Guggenbiller): P: [phone]  Fax: [phone]  Ginna Hathaway (For Jurcevic/Blank): P: [phone]  F: [phone]  Eunice Hoyos (For Matt): P: [phone]  F: [phone]  Teagan Collins (For Vee/Keshav/Yelena): P: [phone]  F: [phone]  Ami Manning (For Walt/Pritesh): P: [phone]  F: [phone]     Adult Sleep Medicine Advanced  "Practice Providers:  Juan Mulligan ([place], [place])  Zoe Parr ([place], [facility])  Sary Jimenez CNP ([place], [place], [place])  Bailey Durbin CNP ([place], [place], [place])  Torie Gutierrez ([place], [place], [place])  Steve Jonas CNP ([place], [place])        OUR SLEEP TESTING LOCATIONS     Our team will contact you to schedule your sleep study, however, you can contact us as follow:  Main Phone Line (scheduling only): [phone]), option 3  Adult and Pediatric Locations  [facility] (6 years and older): [facility] - 4th floor ([address]) After hours line: [phone]  [facility] (Main campus: All ages): [facility], 6th floor. After hours line: [phone]   [place] (5 years and older; younger considered on case-by-case basis): [address]; [address], [address]. Scheduling  After hours line: [phone]   [place] (6 years and older): [address]; [address]; [address]   [place] (6 years and older): [address] [facility] [address]  After hours line: [phone]   [facility] (13 years and older) in [place]: [address], 2nd floor  After hours line: [phone]   [place] (13 year and older): [address]  After hours line: [phone]     Adult Only Locations:   [place] (18 years and older): [address] [facility], 2nd floor   [place] (18 years and older): [address] [facility]; 4th floor  After hours line: [phone]  [facility] (18 years and older) at [place]: [address] [facility]  After hours line: [phone]          CONTACTING YOUR SLEEP MEDICINE PROVIDER     Send a message directly to your provider through \"My Chart\", which is the email service through your  Records Account: [email or web address]   Call [phone] and leave a message. One of the administrative assistants will forward the message " "to your sleep medicine provider through \"My Chart\" and/or email.     Your sleep medicine provider for this visit was: Calin Li MD      "

## 2024-04-23 NOTE — PROGRESS NOTES
Patient: Pascual Márquez    [id number]  : 1995 -- AGE 29 y.o.    Provider: Bev Claudio MD     Location [facility]   Service Date: 2024          [facility] Sleep Medicine Clinic  New Visit Note    HISTORY OF PRESENT ILLNESS     The patient's referring provider is: Torres Solomon DO; Torres Solomon DO    HISTORY OF PRESENT ILLNESS   Pascual Márquez is a 29 y.o. male with a past medical history significant for peripheral artery vasospasm, raynauds syndrome who presents to a [facility] Sleep Medicine Clinic for a comprehensive sleep medicine evaluation. Patient was referred due to a recent diagnosis of keratoconus which is apparently associated with sleep apnea.     Patient is in the  and notes erratic sleeping patterns. He has had to sleep in various places making it hard to get a good routine. Currently he is going back and forth between days and nights. He works 2 months of night shifts (5:30p-5:30a) and then 2 months of days. He has sporadic days off. He has actually done a good job of maintaining a somewhat stable routine. He will get home from work and get about 6-7 hours of sleep.    Patient reports that in the past year his fiance whom he lives with has noted that he snores. He has been waking up with headaches a couple times a week in the past 6 months. His headaches go away within an hour. On days he has headaches he does notice dry mouth as well. Denies any witnessed apneas or nocturia.     Patient notes daytime sleepiness that has been present for years. He used to fall asleep in class when he was in school. He has trouble staying awake especially if he is idle. He reports weakness in his legs when someone makes him laugh, about twice a month. He notes that a few times a week he experiences sleep paralysis and hypnopompic hallucinations. His fiance has noted that occasionally he is active in his sleep- kicking and punching. No injuries due  to this. He does note that on long drives he has to stop often due to feeling sleepy.     Preferred sleeping position: SLEEP POSITION: supine    ESS: 18/24  PROSPER: 13/28  FOSQ: 31/40    REVIEW OF SYSTEMS     REVIEW OF SYSTEMS  Review of Systems  SLEEP ROS: snoring, kicking, decreased concentration, knees buckling with laughing, completely or partially paralyzed while falling asleep or waking up, excessive daytime sleepiness, feels sleepy during the day      ALLERGIES AND MEDICATIONS     ALLERGIES  No Known Allergies    MEDICATIONS  Current Outpatient Medications   Medication Sig Dispense Refill    amLODIPine (Norvasc) 2.5 mg tablet Take 1 tablet (2.5 mg) by mouth once daily. 30 tablet 11     No current facility-administered medications for this visit.         PAST HISTORY     PAST MEDICAL HISTORY  He  has a past medical history of Keratoconus.    PAST SURGICAL HISTORY:  Past Surgical History:   Procedure Laterality Date    TONSILLECTOMY Bilateral        FAMILY HISTORY  Family History   Problem Relation Name Age of Onset    No Known Problems Mother         He does have a family history of sleep disorder. Dad has sleep apnea    SOCIAL HISTORY  He  reports that he has never smoked. He has never used smokeless tobacco. He reports that he does not currently use alcohol after a past usage of about 3.0 standard drinks of alcohol per week. He reports that he does not use drugs. He currently lives with a partner.     Occupation: law enforcement, current history of night shift work    Caffeine consumption: rare  Alcohol consumption: rare  Smoking: No  Marijuana: No      PHYSICAL EXAM     VITAL SIGNS: /74 (BP Location: Left arm, Patient Position: Sitting)   Pulse 78   Temp 36.8 °C (98.2 °F) (Temporal)   Wt 67.1 kg (148 lb)   SpO2 95% Comment: RA  BMI 22.50 kg/m²      CURRENT WEIGHT:   Vitals:    04/23/24 1330   Weight: 67.1 kg (148 lb)     Body mass index is 22.5 kg/m².  PREVIOUS WEIGHTS:  Wt Readings from Last 3  Encounters:   04/23/24 67.1 kg (148 lb)   02/14/24 68.9 kg (152 lb)   01/17/24 68 kg (150 lb)       Physical Exam:  General: Alert, oriented, conversant.  HEENT: Lateral facial profile without retrognathia, nasal septum midline, turbinates nonoggy, oropharynx is Mallampati class 3, tongue normal, jaw occlusion class 1, dentition good.  Extremities: no peripheral edema  Neurologic: Language is normal and fluent, face symmetric, tongue protrusion midline, stance and gait normal.   Psychiatric: Affect appropriate, mood appropriate.    RESULTS/DATA     Bicarbonate (mmol/L)   Date Value   10/16/2023 32             ASSESSMENT/PLAN     Mr. Márquez is a 29 y.o. male and He was referred to the [facility] Sleep Medicine Clinic for evaluation of sleep disordered breathing and hypersomnolence.    #Possible SCOT:  -patient was referred due to hx of keratoconus. There is an apparent connection between these 2, possibly due to a similar mechanism as glaucoma  -he reports snoring and morning headaches. There is a family hx of SCOT in his father   -will assess for SCOT with a in lab PSG    #Hypersomnolence:  -on further history it appears that patient is very sleepy. His [place] today is 18/24. He notes episodes of what sound like cataplexy as well as sleep paralysis and hypnopompic hallucinations. He has a history of falling asleep in class when he was in school and having trouble getting through the day  -will assess for any central disorders of hypersomnolence with an MSLT after his PSG    #?RBD:  -patient notes that occasionally he acts out his dreams and his bed partner has noticed his kicking and punching in his sleep  -he is not on any medications that could cause this, will add RBD montage to his PSG to assess for any RWA      Patient was advised not to drive or operate heavy machinery when sleepy.    Thank you for involving Sleep Medicine in this patient's care. It was a pleasure to see Pascual Márquez today. We  will plan to follow up after testing.    The patient was seen and discussed with attending Dr. Li at the time of the encounter.  Bev Claudio MD  Sleep Medicine Fellow      Attending Addendum:   NPV.  personnel. H/O PTSD, peripheral artery vasospasm and raynauds syndrome. Recently diagnosed with keratoconus and was advised seeing us as there is an increase risk of SCOT in patients with keratoconus. Working on shifts. Has snoring, HA, questionable cataplexy , vivid dreams at sleep onset and hypnopompic sleep paralysis. ESS 18, PROSPER 13. Denies RLS and insomnia. ?SCOT. Cannot rule out narcolepsy completely. Will proceed with PSG with MSLT. RTC 3 months. He verbalized understanding.     Calin Li MD, FCCP, [facility]  Staff Physician  Division of Pulmonary, Critical Care, and Sleep Medicine  [facility]  Clinical Associate Professor of Medicine  [facility]

## 2024-07-23 ENCOUNTER — CLINICAL SUPPORT (OUTPATIENT)
Dept: SLEEP MEDICINE | Facility: HOSPITAL | Age: 29
End: 2024-07-23
Payer: OTHER GOVERNMENT

## 2024-07-23 DIAGNOSIS — R29.818 SUSPECTED SLEEP APNEA: ICD-10-CM

## 2024-07-24 ENCOUNTER — APPOINTMENT (OUTPATIENT)
Dept: SLEEP MEDICINE | Facility: HOSPITAL | Age: 29
End: 2024-07-24
Payer: OTHER GOVERNMENT

## 2024-07-24 ENCOUNTER — CLINICAL SUPPORT (OUTPATIENT)
Dept: SLEEP MEDICINE | Facility: HOSPITAL | Age: 29
End: 2024-07-24
Payer: OTHER GOVERNMENT

## 2024-07-24 VITALS — HEIGHT: 68 IN | WEIGHT: 149.91 LBS | BODY MASS INDEX: 22.72 KG/M2

## 2024-07-24 DIAGNOSIS — G47.10 HYPERSOMNOLENCE: ICD-10-CM

## 2024-07-24 LAB
AMPHETAMINES UR QL SCN: NORMAL
BARBITURATES UR QL SCN: NORMAL
BENZODIAZ UR QL SCN: NORMAL
BZE UR QL SCN: NORMAL
CANNABINOIDS UR QL SCN: NORMAL
FENTANYL+NORFENTANYL UR QL SCN: NORMAL
METHADONE UR QL SCN: NORMAL
OPIATES UR QL SCN: NORMAL
OXYCODONE+OXYMORPHONE UR QL SCN: NORMAL
PCP UR QL SCN: NORMAL

## 2024-07-24 PROCEDURE — 80307 DRUG TEST PRSMV CHEM ANLYZR: CPT

## 2024-07-24 ASSESSMENT — SLEEP AND FATIGUE QUESTIONNAIRES
HOW LIKELY ARE YOU TO NOD OFF OR FALL ASLEEP WHEN YOU ARE A PASSENGER IN A CAR FOR AN HOUR WITHOUT A BREAK: SLIGHT CHANCE OF DOZING
HOW LIKELY ARE YOU TO NOD OFF OR FALL ASLEEP WHILE WATCHING TV: SLIGHT CHANCE OF DOZING
HOW LIKELY ARE YOU TO NOD OFF OR FALL ASLEEP WHILE SITTING QUIETLY AFTER LUNCH WITHOUT ALCOHOL: MODERATE CHANCE OF DOZING
SITING INACTIVE IN A PUBLIC PLACE LIKE A CLASS ROOM OR A MOVIE THEATER: MODERATE CHANCE OF DOZING
HOW LIKELY ARE YOU TO NOD OFF OR FALL ASLEEP WHILE LYING DOWN TO REST IN THE AFTERNOON WHEN CIRCUMSTANCES PERMIT: HIGH CHANCE OF DOZING
ESS-CHAD TOTAL SCORE: 13
HOW LIKELY ARE YOU TO NOD OFF OR FALL ASLEEP IN A CAR, WHILE STOPPED FOR A FEW MINUTES IN TRAFFIC: SLIGHT CHANCE OF DOZING
HOW LIKELY ARE YOU TO NOD OFF OR FALL ASLEEP WHILE SITTING AND TALKING TO SOMEONE: SLIGHT CHANCE OF DOZING
HOW LIKELY ARE YOU TO NOD OFF OR FALL ASLEEP WHILE SITTING AND READING: MODERATE CHANCE OF DOZING

## 2024-07-24 NOTE — PROGRESS NOTES
[facility] TECH NOTE:     Patient: Pascual Márquez   MRN//AGE: [id number]  1995  29 y.o.   Technologist: Malgorzata Yousif   Room: 402   Service Date: 2024        Sleep Testing Location: [facility]    [place]: 13    TECHNOLOGIST SLEEP STUDY PROCEDURE NOTE:   This sleep study is being conducted according to the policies and procedures outlined by the AAS accreditation standards.  The sleep study procedure and processes involved during this appointment was explained to the patient/patient’s family, questions were answered. The patient/family verbalized understanding.      The patient is a 29 y.o. year old male scheduled for aMSLT/MWT with montage of:  mslt . he arrived for his appointment.      The study that was ultimately completed was aMSLT/MWT with montage of:  mslt .    The full study Was completed.  Patient questionnaires completed?: yes     Consents signed? yes    Initial Fall Risk Screening:     Pascual has not fallen in the last 6 months. his did not result in injury. Pascual does not have a fear of falling. He does not need assistance with sitting, standing, or walking. he does not need assistance walking in his home. he does not need assistance in an unfamiliar setting. The patient is notusing an assistive device.     Brief Study observations: 5 naps were completed. No REM sleep seen in any naps. No sleep in the final two naps.     Deviation to order/protocol and reason: none      If PAP, which was preferred mask/pressure/mode: na      Other:None    After the procedure, the patient/family was informed to ensure followup with ordering clinician for testing results.      Technologist: Malgorzata Yousif  Subjective   Patient ID: Pascual Márquez is a 29 y.o. male who presents for No chief complaint on file..  [facility]    Review of Systems    Objective   Physical Exam    Assessment/Plan            Malgorzata Yousif 24 4:57 PM    11-Jan-2023 19:00

## 2024-07-24 NOTE — PROGRESS NOTES
[facility] TECH NOTE:     Patient: Pascual Márquez   MRN//AGE: [id number]  1995  29 y.o.   Technologist: Ciarra Cornejo   Room: 1   Service Date: 2024        Sleep Testing Location: [facility]     [place]: 13    TECHNOLOGIST SLEEP STUDY PROCEDURE NOTE:   This sleep study is being conducted according to the policies and procedures outlined by the AAS accreditation standards.  The sleep study procedure and processes involved during this appointment was explained to the patient/patient’s family, questions were answered. The patient/family verbalized understanding.      The patient is a 29 y.o. year old male scheduled for a Diagnostic PSG  with montage of: RBD.     The study that was ultimately completed was a Diagnostic PSG with montage of: RBD.    The full study Was completed.  Patient questionnaires completed?: yes     Consents signed? yes    Initial Fall Risk Screening:     Pascual has not fallen in the last 6 months.  Pascual does not have a fear of falling. He does not need assistance with sitting, standing, or walking. He does not need assistance walking in his home. He does not need assistance in an unfamiliar setting. The patient is notusing an assistive device.     Brief Study observations: Patient is a 29 year old male here for a Diagnostic PSG to be followed by an MSLT in the morning.  Patient c/o non-restorative sleep.  Study remained PSG due to an AHI of less than 30 after two hours of total sleep time, following lab protocols.  CMS guidelines used for scoring based on insurance requirements.  Arm leads added for REM behavior protocols, per order.  No unusual movements or behaviors noted during study.  No snore observed.  More than 6 hours of sleep observed during overnight PSG, MSLT will continue on 2024      Deviation to order/protocol and reason: None      If PAP, which was preferred mask/pressure/mode: NA      Other:None    After the procedure, the patient/family was informed to  ensure followup with ordering clinician for testing results.      Technologist: KAIT Barajas

## 2024-07-26 ENCOUNTER — APPOINTMENT (OUTPATIENT)
Dept: PRIMARY CARE | Facility: CLINIC | Age: 29
End: 2024-07-26
Payer: OTHER GOVERNMENT

## 2024-08-06 ENCOUNTER — TELEPHONE (OUTPATIENT)
Dept: SLEEP MEDICINE | Facility: CLINIC | Age: 29
End: 2024-08-06
Payer: OTHER GOVERNMENT

## 2024-08-06 NOTE — TELEPHONE ENCOUNTER
Pascual has a recent diagnosis of keratoconus. Concerned about SCOT. He had an in-lab sleep study on 7/23/2024 at  Sleep Lab, which revealed no SCOT with an overall AHI4% of 0.2, AHI3% of 0.3. and an SpO2 sami of 93%. MSLT on 7/24/2024 showed no evidence of narcolepsy. Mean SL was 14.8 min with no SOREMP. Urine tox screen was negative. Discussed study results on the phone. He was pleased that there is no obvious evidence of sleep disorders. He felt his EDS might have been due to his shift changes. Advised that he get adequate sleep while on night shifts. He verbalized understanding. No need for further F/U.     Calin Li MD, FCCP, [facility]  Staff Physician  Division of Pulmonary, Critical Care, and Sleep Medicine  [facility]  Clinical Associate Professor of Medicine  [facility]

## 2024-08-15 ENCOUNTER — APPOINTMENT (OUTPATIENT)
Dept: PRIMARY CARE | Facility: CLINIC | Age: 29
End: 2024-08-15
Payer: OTHER GOVERNMENT

## 2024-08-22 ENCOUNTER — APPOINTMENT (OUTPATIENT)
Dept: PRIMARY CARE | Facility: CLINIC | Age: 29
End: 2024-08-22
Payer: OTHER GOVERNMENT

## 2024-08-22 ENCOUNTER — HOSPITAL ENCOUNTER (OUTPATIENT)
Dept: RADIOLOGY | Facility: CLINIC | Age: 29
Discharge: HOME | End: 2024-08-22
Payer: OTHER GOVERNMENT

## 2024-08-22 VITALS
OXYGEN SATURATION: 96 % | BODY MASS INDEX: 22.43 KG/M2 | SYSTOLIC BLOOD PRESSURE: 146 MMHG | WEIGHT: 147.5 LBS | HEART RATE: 78 BPM | TEMPERATURE: 98.3 F | DIASTOLIC BLOOD PRESSURE: 83 MMHG | RESPIRATION RATE: 16 BRPM

## 2024-08-22 DIAGNOSIS — B07.0 PLANTAR WART: ICD-10-CM

## 2024-08-22 DIAGNOSIS — M62.830 LUMBAR PARASPINAL MUSCLE SPASM: ICD-10-CM

## 2024-08-22 DIAGNOSIS — H53.9 VISION CHANGES: Primary | ICD-10-CM

## 2024-08-22 PROCEDURE — 72100 X-RAY EXAM L-S SPINE 2/3 VWS: CPT

## 2024-08-22 ASSESSMENT — ENCOUNTER SYMPTOMS
LIGHT-HEADEDNESS: 0
SHORTNESS OF BREATH: 0
FEVER: 0
NAUSEA: 0
DIZZINESS: 0
VOMITING: 0

## 2024-08-22 NOTE — PROGRESS NOTES
Subjective   Pascual Márquez is a 29 y.o. male who presents for Referral (Pt here today for referral to physical therapy for hip and back. And for podiatrist for planters wart.).  Patient seen today for multiple concerns.    Patient is following up with sleep medicine, no additional findings necessary at this time.    Patient needs additional referral to ophthalmology for corneal procedure, has been seen an ophthalmologist, and cath recommendation.  Per insurance he needs a referral from his primary care physician.    Patient is interested in physical therapy and further imaging of his low back, has bilateral low back pain that does now cause left-sided hip pain, lateral hip pain, tenderness to palpation, worse with hip internal rotation.        Review of Systems   Constitutional:  Negative for fever.   Respiratory:  Negative for shortness of breath.    Cardiovascular:  Negative for chest pain.   Gastrointestinal:  Negative for nausea and vomiting.   Neurological:  Negative for dizziness and light-headedness.   All other systems reviewed and are negative.      Objective   Physical Exam  Vitals reviewed.   Constitutional:       General: He is not in acute distress.     Appearance: Normal appearance. He is not toxic-appearing.   HENT:      Head: Normocephalic and atraumatic.      Nose: Nose normal.   Eyes:      Extraocular Movements: Extraocular movements intact.   Cardiovascular:      Rate and Rhythm: Normal rate and regular rhythm.      Heart sounds: No murmur heard.     No friction rub. No gallop.   Pulmonary:      Effort: Pulmonary effort is normal. No respiratory distress.      Breath sounds: Normal breath sounds. No wheezing, rhonchi or rales.   Musculoskeletal:      Comments: Tenderness to palpation of lumbar paraspinals, tenderness to palpation of greater trochanter on the left, negative Trendelenburg sign, limited range of motion of hamstrings bilateral   Skin:     General: Skin is warm and dry.    Neurological:      General: No focal deficit present.      Mental Status: He is alert.   Psychiatric:         Mood and Affect: Mood normal.         Behavior: Behavior normal.       Destruction of lesion    Date/Time: 8/22/2024 3:32 PM    Performed by: Torres Solomon DO  Authorized by: Torres Solomon DO    Number of Lesions: 1  Lesion 1:     Body area: lower extremity    Lower extremity location: R foot    Initial size (mm): 10    Final defect size (mm): 15    Malignancy: benign lesion      Destruction method: cryotherapy      Comments:  Wart removal cryotherapy and paring with scalpel, tolerated well without complication      Assessment/Plan   Problem List Items Addressed This Visit    None  Visit Diagnoses       Vision changes    -  Primary    Relevant Orders    Referral to Ophthalmology    Lumbar paraspinal muscle spasm        Relevant Orders    Referral to Physical Therapy    XR lumbar spine 2-3 views          Patient seen today for multiple concerns    Will refer to ophthalmology for corneal procedure  We will refer to physical therapy and get lumbar x-rays for chronic muscular lumbar back pain    Continue follow-up with sleep medicine only as needed    Follow-up in 2 weeks for repeat treatment of plantar wart, as above tolerated well without complication.    Follow-up as new concerns arise.

## 2024-09-05 ENCOUNTER — HOSPITAL ENCOUNTER (OUTPATIENT)
Dept: RADIOLOGY | Facility: CLINIC | Age: 29
Discharge: HOME | End: 2024-09-05
Payer: OTHER GOVERNMENT

## 2024-09-05 ENCOUNTER — OFFICE VISIT (OUTPATIENT)
Dept: PRIMARY CARE | Facility: CLINIC | Age: 29
End: 2024-09-05
Payer: OTHER GOVERNMENT

## 2024-09-05 VITALS
SYSTOLIC BLOOD PRESSURE: 132 MMHG | BODY MASS INDEX: 22.26 KG/M2 | RESPIRATION RATE: 16 BRPM | WEIGHT: 146.38 LBS | OXYGEN SATURATION: 96 % | DIASTOLIC BLOOD PRESSURE: 74 MMHG | TEMPERATURE: 98.6 F | HEART RATE: 74 BPM

## 2024-09-05 DIAGNOSIS — M62.830 LUMBAR PARASPINAL MUSCLE SPASM: ICD-10-CM

## 2024-09-05 DIAGNOSIS — M62.830 LUMBAR PARASPINAL MUSCLE SPASM: Primary | ICD-10-CM

## 2024-09-05 DIAGNOSIS — B07.0 PLANTAR WART: ICD-10-CM

## 2024-09-05 PROCEDURE — 73522 X-RAY EXAM HIPS BI 3-4 VIEWS: CPT

## 2024-09-05 PROCEDURE — 99214 OFFICE O/P EST MOD 30 MIN: CPT | Performed by: STUDENT IN AN ORGANIZED HEALTH CARE EDUCATION/TRAINING PROGRAM

## 2024-09-05 PROCEDURE — 17000 DESTRUCT PREMALG LESION: CPT | Performed by: STUDENT IN AN ORGANIZED HEALTH CARE EDUCATION/TRAINING PROGRAM

## 2024-09-05 RX ORDER — ACYCLOVIR 800 MG/1
800 TABLET ORAL
COMMUNITY
Start: 2024-09-03

## 2024-09-06 NOTE — PROGRESS NOTES
Subjective   Pascual Márquez is a 29 y.o. male who presents for Plantar Warts (Pt here today to F/U for plantars wart to left foot and additional imaging of left hip).  HPI  Patient seen for follow-up plantar wart, significant proved after first treatment, still present, likely will need second treatment.    Patient also interested in getting x-ray of hip, x-ray backs reviewed, patient will start physical therapy soon, has his wedding coming up as well.      Review of Systems    Objective   Physical Exam  Vitals reviewed.   Constitutional:       General: He is not in acute distress.     Appearance: Normal appearance. He is not toxic-appearing.   HENT:      Head: Normocephalic and atraumatic.      Nose: Nose normal.   Eyes:      Extraocular Movements: Extraocular movements intact.   Cardiovascular:      Rate and Rhythm: Normal rate and regular rhythm.      Heart sounds: No murmur heard.     No friction rub. No gallop.   Pulmonary:      Effort: Pulmonary effort is normal. No respiratory distress.      Breath sounds: Normal breath sounds. No wheezing, rhonchi or rales.   Skin:     General: Skin is warm and dry.      Comments: Plantar wart present, significant improved from previous   Neurological:      General: No focal deficit present.      Mental Status: He is alert.   Psychiatric:         Mood and Affect: Mood normal.         Behavior: Behavior normal.       Destruction of lesion    Date/Time: 9/5/2024 11:59 PM    Performed by: Torres Solomon DO  Authorized by: Torres Solomon DO    Number of Lesions: 1  Lesion 1:     Body area: lower extremity    Lower extremity location: R foot    Initial size (mm): 20    Malignancy: benign lesion      Destruction method: cryotherapy          Assessment/Plan   Problem List Items Addressed This Visit    None  Visit Diagnoses       Lumbar paraspinal muscle spasm    -  Primary    Relevant Orders    XR hips bilateral 3 or 4 VW w pelvis when performed          Patient seen today for  follow-up plantar wart as well as hip x-rays.    Wart cryotherapy as above, tolerated well.    Continue follow-up with physical therapy for hip and low back pain.    Follow-up as new concerns arise.

## 2024-09-27 ENCOUNTER — APPOINTMENT (OUTPATIENT)
Dept: PRIMARY CARE | Facility: CLINIC | Age: 29
End: 2024-09-27
Payer: OTHER GOVERNMENT

## 2024-10-29 ENCOUNTER — EVALUATION (OUTPATIENT)
Dept: PHYSICAL THERAPY | Facility: CLINIC | Age: 29
End: 2024-10-29
Payer: OTHER GOVERNMENT

## 2024-10-29 DIAGNOSIS — M62.830 LUMBAR PARASPINAL MUSCLE SPASM: ICD-10-CM

## 2024-10-29 DIAGNOSIS — M54.50 LOW BACK PAIN: Primary | ICD-10-CM

## 2024-10-29 PROCEDURE — 97161 PT EVAL LOW COMPLEX 20 MIN: CPT | Mod: GP

## 2024-10-29 PROCEDURE — 97110 THERAPEUTIC EXERCISES: CPT | Mod: GP

## 2024-11-04 ENCOUNTER — TREATMENT (OUTPATIENT)
Dept: PHYSICAL THERAPY | Facility: CLINIC | Age: 29
End: 2024-11-04
Payer: OTHER GOVERNMENT

## 2024-11-04 DIAGNOSIS — M62.830 LUMBAR PARASPINAL MUSCLE SPASM: ICD-10-CM

## 2024-11-04 DIAGNOSIS — M54.50 LOW BACK PAIN: ICD-10-CM

## 2024-11-04 PROCEDURE — 97110 THERAPEUTIC EXERCISES: CPT | Mod: GP

## 2024-11-04 NOTE — PROGRESS NOTES
"Physical Therapy    Physical Therapy Treatment    Patient Name: Pascual Márquez  MRN: [id number]  Today's Date: 11/4/2024  Time Calculation  Start Time: 1458  Stop Time: 1548  Time Calculation (min): 50 min        Insurance:  Visit: #2  Authorized: 7 visits (31 units)  Certification: 10-23-24 to 2-20-25  Payor:  / Plan: [employer]  / Product Type: *No Product type* /     Subjective:  \"I've been able to do the exercises about twice a day. I really feel less stiffness when I do the exercises when I wake up. My hamstrings are feeling looser. I still get a lot of tightness in my hip flexor area.\"     Current pain: 2/10; Range: 1-7/10    Objective:      Treatment:  Sup pelvic tilt x 10   Sup 90-90 active hamstring stretch x 10 sec x 10   Sup Ashvin test stretch x 30 sec x 3  Sup TA march x 10   Sup TA knee ext x 10  S/L hip ABD x 10  Half kneeling hip flexor stretch x 30 sec x 3  Prone hip ext x 10     OP Education: HEP: #3, 6, 7, 8    Charges: TE x 3      Diagnosis:  1. Lumbar paraspinal muscle spasm    2. Low back pain        Assessment:   Pt demonstrates good tolerance with HEP and reports decreased sx reports and positive response to there ex.    Plan:  Continue with focus on abdominal and gluteal activation exercise along with LE flexbility.    Alfredo Greenfield, PT  "

## 2024-11-05 ENCOUNTER — APPOINTMENT (OUTPATIENT)
Dept: PHYSICAL THERAPY | Facility: CLINIC | Age: 29
End: 2024-11-05
Payer: OTHER GOVERNMENT

## 2024-11-12 ENCOUNTER — APPOINTMENT (OUTPATIENT)
Dept: PHYSICAL THERAPY | Facility: CLINIC | Age: 29
End: 2024-11-12
Payer: OTHER GOVERNMENT

## 2024-11-13 ENCOUNTER — TREATMENT (OUTPATIENT)
Dept: PHYSICAL THERAPY | Facility: CLINIC | Age: 29
End: 2024-11-13
Payer: OTHER GOVERNMENT

## 2024-11-13 DIAGNOSIS — M62.830 LUMBAR PARASPINAL MUSCLE SPASM: ICD-10-CM

## 2024-11-13 DIAGNOSIS — M54.50 LOW BACK PAIN: ICD-10-CM

## 2024-11-13 PROCEDURE — 97110 THERAPEUTIC EXERCISES: CPT | Mod: GP

## 2024-11-13 NOTE — PROGRESS NOTES
"Physical Therapy    Physical Therapy Treatment    Patient Name: Pascual Márquez  MRN: [id number]  Today's Date: 11/13/2024  Time Calculation  Start Time: 0902  Stop Time: 0952  Time Calculation (min): 50 min        Insurance:  Visit: #3  Authorized: 7 visits (31 units)  Certification: 10-23-24 to 2-20-25  Payor:  / Plan: [employer]  / Product Type: *No Product type* /     Subjective:  \"It's definitely coming along. I'm starting to feel pretty good. I did have some discomfort one day when I was trying to side leg lift.\"     Current pain: 0/10; Range: 0-4.5/10    Objective:      Treatment:  Rec bike x 5 min  Half kneeling hip flexor stretch x 30 sec x 3  Half kneeling overhead ball lift x 10   Half kneeling diagonal ball lift x 10   Standing mini squat with green band around distal thighs x 10   SLS with green band around distal thighs x 30 sec x 3  Quadruped hip ext with contralateral UE elevation x 10 sec x 6  Prone hip ext x 10   S/L hip ABD x 10     OP Education: HEP: #5-8    Charges: TE x 3      Diagnosis:  1. Lumbar paraspinal muscle spasm    2. Low back pain        Assessment:   Pt continues to demonstrate decreasing sx irritability and good compliance with HEP.    Plan:  Continue with focus on abdominal and gluteal activation exercise along with LE flexbility.    Alfredo Greenfield, PT  "

## 2024-11-26 ENCOUNTER — TREATMENT (OUTPATIENT)
Dept: PHYSICAL THERAPY | Facility: CLINIC | Age: 29
End: 2024-11-26
Payer: OTHER GOVERNMENT

## 2024-11-26 DIAGNOSIS — M54.50 LOW BACK PAIN: ICD-10-CM

## 2024-11-26 DIAGNOSIS — M62.830 LUMBAR PARASPINAL MUSCLE SPASM: ICD-10-CM

## 2024-11-26 PROCEDURE — 97110 THERAPEUTIC EXERCISES: CPT | Mod: GP

## 2024-11-26 NOTE — PROGRESS NOTES
"Physical Therapy    Physical Therapy Treatment    Patient Name: Pascual Márquez  MRN: [id number]  Today's Date: 11/26/2024  Time Calculation  Start Time: 1458  Stop Time: 1543  Time Calculation (min): 45 min        Insurance:  Visit: #4  Authorized: 7 visits (31 units)  Certification: 10-23-24 to 2-20-25  Payor:  / Plan: [employer]  / Product Type: *No Product type* /     Subjective:  \"I had been feeling really good. I had a setback over the weekend, but I don't recall doing anything specific.\"     Current pain: 2/10; Range: 0-5/10    Objective:      Treatment:  Rec bike x 6 min  Half kneeling hip flexor stretch x 30 sec x 3  Half kneeling overhead ball lift x 10   Half kneeling diagonal ball lift x 10   SLS with hip hinge x 10   SLS with contralateral hip ABD isometric x 30  Prone plank to hip flexion with knee hover x 10 sec x 10   S/L plank with hip ABD-ER x 10   Quadruped hip ext with ipsilateral UE elevation x 10 sec x 6    OP Education: HEP: #5    Charges: TE x 3      Diagnosis:  1. Lumbar paraspinal muscle spasm    2. Low back pain        Assessment:   Pt continues to demonstrate improvement with decreased sx and ability to tolerance exercise progressions without sx.     Plan:  Continue with focus on abdominal and gluteal activation exercise along with LE flexibility; progress WB there ex as tolerated.     Alfredo Greenfield, PT  "

## 2024-12-09 ENCOUNTER — TREATMENT (OUTPATIENT)
Dept: PHYSICAL THERAPY | Facility: CLINIC | Age: 29
End: 2024-12-09
Payer: OTHER GOVERNMENT

## 2024-12-09 DIAGNOSIS — M54.50 LOW BACK PAIN: ICD-10-CM

## 2024-12-09 DIAGNOSIS — M62.830 LUMBAR PARASPINAL MUSCLE SPASM: ICD-10-CM

## 2024-12-09 PROCEDURE — 97110 THERAPEUTIC EXERCISES: CPT | Mod: GP

## 2024-12-09 NOTE — PROGRESS NOTES
"Physical Therapy    Physical Therapy Treatment    Patient Name: Pascual Márquez  MRN: [id number]  Today's Date: 12/9/2024  Time Calculation  Start Time: 1501  Stop Time: 1552  Time Calculation (min): 51 min        Insurance:  Visit: #5  Authorized: 7 visits (31 units)  Certification: 10-23-24 to 2-20-25  Payor:  / Plan: [employer]  / Product Type: *No Product type* /     Subjective:  \"I've been feeling pretty good. It's definitely improved since we started.\"     Current pain: 0/10; Range: 0-2/10    Objective:      Treatment:  Rec bike x 6 min  Half kneeling hip flexor stretch x 30 sec x 3  Half kneeling overhead ball lift x 10   Half kneeling diagonal ball lift x 10   SLS with hip hinge x 10   SLS squat with contralateral LE reaching x 10   Standing hip in/out x 10 with contralateral SLS  SLS with contralateral hip ABD isometric x 30  Prone plank to hip flexion with knee hover x 10 sec x 10   Prone hand walk out on ball x 4  Sup reverse plank with heels on ball x 10   S/L plank with hip ABD-ER x 10   Quadruped hip ext with ipsilateral UE elevation x 10 sec x 6    OP Education: HEP: #6-7, 10-11    Charges: TE x 3      Diagnosis:  1. Lumbar paraspinal muscle spasm    2. Low back pain        Assessment:   Pt continues to demonstrate improvement with decreased sx and ability to tolerance exercise progressions without sx.     Plan:  Continue with focus on abdominal and gluteal activation exercise along with LE flexibility; progress WB there ex as tolerated.     Alfredo Greenfield, PT  "

## 2024-12-31 ENCOUNTER — APPOINTMENT (OUTPATIENT)
Dept: PHYSICAL THERAPY | Facility: CLINIC | Age: 29
End: 2024-12-31
Payer: OTHER GOVERNMENT

## 2025-01-21 ENCOUNTER — TREATMENT (OUTPATIENT)
Dept: PHYSICAL THERAPY | Facility: CLINIC | Age: 30
End: 2025-01-21
Payer: OTHER GOVERNMENT

## 2025-01-21 DIAGNOSIS — M62.830 LUMBAR PARASPINAL MUSCLE SPASM: ICD-10-CM

## 2025-01-21 DIAGNOSIS — M54.50 LOW BACK PAIN: ICD-10-CM

## 2025-01-21 PROCEDURE — 97110 THERAPEUTIC EXERCISES: CPT | Mod: GP

## 2025-01-21 NOTE — PROGRESS NOTES
"Physical Therapy    Physical Therapy Treatment    Patient Name: Pascual Márquez  MRN: [id number]  Today's Date: 1/21/2025  Time Calculation  Start Time: 0858  Stop Time: 0946  Time Calculation (min): 48 min        Insurance:  Visit: #6  Authorized: 7 visits (31 units)  Certification: 10-23-24 to 2-20-25  Payor:  / Plan: [employer]  / Product Type: *No Product type* /     Subjective:  \"I've been feeling pretty good. It's definitely improved since we started.\"     Current pain: 2/10; Range: 0-2/10    Objective:      Treatment:  Rec bike x 6 min  Half kneeling hip flexor stretch x 30 sec x 3  Half kneeling diagonal ball lift x 10 x 3  Dynamic warm up: SLR, SKTC, x 10 x 2   SLS with hip swing AP x 20 sec, lateral x 20 sec  SLS with hip hinge x 10   SLS squat with contralateral LE reaching x 10   Standing hip in/out x 10 with contralateral SLS  SLS with contralateral hip ABD isometric x 30  Standing split squat x 10     OP Education: reviewed     Charges: TE x 3      Diagnosis:  1. Lumbar paraspinal muscle spasm    2. Low back pain        Assessment:   Pt continues to demonstrate improvement with decreased sx and ability to tolerance exercise progressions without sx.     Plan:  Continue with focus on abdominal and gluteal activation exercise along with LE flexibility; progress WB there ex as tolerated.     Alfredo Greenfield, PT  "

## 2025-02-04 ENCOUNTER — TREATMENT (OUTPATIENT)
Dept: PHYSICAL THERAPY | Facility: CLINIC | Age: 30
End: 2025-02-04
Payer: OTHER GOVERNMENT

## 2025-02-04 DIAGNOSIS — M62.830 LUMBAR PARASPINAL MUSCLE SPASM: ICD-10-CM

## 2025-02-04 DIAGNOSIS — M54.50 LOW BACK PAIN: ICD-10-CM

## 2025-02-04 PROCEDURE — 97110 THERAPEUTIC EXERCISES: CPT | Mod: GP

## 2025-02-04 NOTE — PROGRESS NOTES
"Physical Therapy    Physical Therapy Treatment    Patient Name: Pascual Márquez \"Supa\"  MRN: [id number]  Today's Date: 2/4/2025  Time Calculation  Start Time: 0856  Stop Time: 0939  Time Calculation (min): 43 min        Insurance:  Visit: #7  Authorized: 7 visits (31 units)  Certification: 10-23-24 to 2-20-25  Payor:  / Plan: [employer]  / Product Type: *No Product type* /     Subjective:  \"I definitely feel better. My consistency in my ability to move is better. I don't have the spasms anymore. I still get tightness in my back. My hips have been feeling pretty good.\"     Current pain: 2/10; Range: 0-2/10    Objective:      Treatment:  Rec bike x 6 min  Half kneeling hip flexor stretch x 30 sec x 3  Half kneeling diagonal ball lift x 10 x 3  Dynamic warm up: SLR, SKTC, x 10 x 2   SLS with hip swing AP x 20 sec, lateral x 20 sec  SLS with hip hinge x 10 x 3   SLS squat with contralateral LE reaching x 10   Standing hip in/out x 10 with contralateral SLS  SLS with contralateral hip ABD isometric x 30 sec x 3  Standing split squat x 10     OP Education: reviewed     Charges: TE x 3      Diagnosis:  1. Lumbar paraspinal muscle spasm    2. Low back pain        Assessment:   Pt has met all goals at this time. Considerable improvement noted with sx frequency and intensity.     Goals:  Improve GABRIELA > 9 by 8 weeks - MET  Improve AROM lumbar ext to 20 degrees to allow walking/running without sx by 8 weeks - MET  Improve MMT hip ABD to 5/5 to allow squatting to lift from the ground by 8 weeks - MET  Sit for 2 hours without sx increase by 8 weeks - MET    Plan:  Discharge with [facility] for self management.     Alfredo Greenfield, PT  "

## 2025-02-24 ENCOUNTER — APPOINTMENT (OUTPATIENT)
Dept: PRIMARY CARE | Facility: CLINIC | Age: 30
End: 2025-02-24
Payer: OTHER GOVERNMENT

## 2025-02-24 VITALS
WEIGHT: 149 LBS | TEMPERATURE: 98.4 F | OXYGEN SATURATION: 96 % | BODY MASS INDEX: 22.66 KG/M2 | DIASTOLIC BLOOD PRESSURE: 79 MMHG | RESPIRATION RATE: 16 BRPM | SYSTOLIC BLOOD PRESSURE: 137 MMHG | HEART RATE: 97 BPM

## 2025-02-24 DIAGNOSIS — M72.2 PLANTAR FASCIITIS: ICD-10-CM

## 2025-02-24 DIAGNOSIS — H53.9 VISION CHANGES: Primary | ICD-10-CM

## 2025-02-24 DIAGNOSIS — B07.0 PLANTAR WART: ICD-10-CM

## 2025-02-24 PROCEDURE — 1036F TOBACCO NON-USER: CPT | Performed by: STUDENT IN AN ORGANIZED HEALTH CARE EDUCATION/TRAINING PROGRAM

## 2025-02-24 PROCEDURE — 99214 OFFICE O/P EST MOD 30 MIN: CPT | Performed by: STUDENT IN AN ORGANIZED HEALTH CARE EDUCATION/TRAINING PROGRAM

## 2025-02-24 ASSESSMENT — ENCOUNTER SYMPTOMS
SHORTNESS OF BREATH: 0
OCCASIONAL FEELINGS OF UNSTEADINESS: 0
DEPRESSION: 0
LIGHT-HEADEDNESS: 0
NAUSEA: 0
LOSS OF SENSATION IN FEET: 0
DIZZINESS: 0
FEVER: 0
VOMITING: 0

## 2025-02-24 NOTE — PROGRESS NOTES
"Derek Márquez \"Supa\" is a 30 y.o. male who presents for Referral (Pt here today to request referrals for podiatry, optomology, and derm).  Patient seen if multiple concerns, his previous ophthalmology referral had  he is interested in restarting this 1 today.  He has had no additional concerns but is awaiting evaluation for surgery.    Patient also had persistent work, there was some improvement after initial treatment in our office but is requiring further evaluation.  Is interested in referral to dermatology.    Patient is had lower extremity bilateral foot pain, worse in the morning progressively improves with movement, worse after physical activity, tenderness at the heel that goes towards the toes on the plantar aspect.  He has tried using a lacrosse ball, other foot activities, he is not using any orthotic supports.            Review of Systems   Constitutional:  Negative for fever.   Respiratory:  Negative for shortness of breath.    Cardiovascular:  Negative for chest pain.   Gastrointestinal:  Negative for nausea and vomiting.   Neurological:  Negative for dizziness and light-headedness.   All other systems reviewed and are negative.      Objective   Physical Exam  Vitals reviewed.   Constitutional:       General: He is not in acute distress.     Appearance: Normal appearance. He is not toxic-appearing.   HENT:      Head: Normocephalic and atraumatic.      Nose: Nose normal.   Eyes:      Extraocular Movements: Extraocular movements intact.   Cardiovascular:      Rate and Rhythm: Normal rate and regular rhythm.      Heart sounds: No murmur heard.     No friction rub. No gallop.   Pulmonary:      Effort: Pulmonary effort is normal. No respiratory distress.      Breath sounds: Normal breath sounds. No wheezing, rhonchi or rales.   Skin:     General: Skin is warm and dry.   Neurological:      General: No focal deficit present.      Mental Status: He is alert.   Psychiatric:         Mood " and Affect: Mood normal.         Behavior: Behavior normal.         Assessment/Plan   Problem List Items Addressed This Visit    None  Visit Diagnoses       Vision changes    -  Primary    Relevant Orders    Referral to Ophthalmology    Plantar wart        Relevant Orders    Referral to Dermatology    Plantar fasciitis        Relevant Orders    Referral to Podiatry          Patient seen in for multiple concerns, for plantar warts we will refer to dermatology    For patient plantar fasciitis we will refer to podiatry    Patient has repeat evaluation with ophthalmology for his previous vision changes.    Follow-up as new concerns arise.

## 2025-04-14 ENCOUNTER — APPOINTMENT (OUTPATIENT)
Dept: OPHTHALMOLOGY | Facility: CLINIC | Age: 30
End: 2025-04-14
Payer: OTHER GOVERNMENT

## 2025-04-14 DIAGNOSIS — H18.603 KERATOCONUS OF BOTH EYES: Primary | ICD-10-CM

## 2025-04-14 DIAGNOSIS — H53.9 VISION CHANGES: ICD-10-CM

## 2025-04-14 LAB
KMAX (OD): 61.4 DIOPTERS
KMAX (OS): 47.9 DIOPTERS
PACH THINNEST (OD): 419 MICRONS
PACH THINNEST (OS): 478 MICRONS
SIMK FLAT (OD): 47.4 DIOPTERS
SIMK FLAT (OS): 44.6 DIOPTERS
SIMK STEEP (OD): 53.1 DIOPTERS
SIMK STEEP (OS): 46.3 DIOPTERS

## 2025-04-14 PROCEDURE — 99214 OFFICE O/P EST MOD 30 MIN: CPT | Performed by: OPHTHALMOLOGY

## 2025-04-14 PROCEDURE — 92025 CPTRIZED CORNEAL TOPOGRAPHY: CPT | Performed by: OPHTHALMOLOGY

## 2025-04-14 ASSESSMENT — VISUAL ACUITY
METHOD: SNELLEN - LINEAR
OD_PH_CC: 20/40-1
OD_CC: 20/50-1
OS_CC: 20/20-1

## 2025-04-14 ASSESSMENT — CONF VISUAL FIELD
OS_INFERIOR_NASAL_RESTRICTION: 0
OS_SUPERIOR_TEMPORAL_RESTRICTION: 0
OS_NORMAL: 1
OD_SUPERIOR_NASAL_RESTRICTION: 0
OS_SUPERIOR_NASAL_RESTRICTION: 0
OD_NORMAL: 1
OS_INFERIOR_TEMPORAL_RESTRICTION: 0
OD_INFERIOR_TEMPORAL_RESTRICTION: 0
OD_INFERIOR_NASAL_RESTRICTION: 0
OD_SUPERIOR_TEMPORAL_RESTRICTION: 0

## 2025-04-14 ASSESSMENT — CUP TO DISC RATIO
OD_RATIO: 0.2
OS_RATIO: 0.2

## 2025-04-14 ASSESSMENT — ENCOUNTER SYMPTOMS: EYES NEGATIVE: 1

## 2025-04-14 ASSESSMENT — SLIT LAMP EXAM - LIDS
COMMENTS: NORMAL
COMMENTS: NORMAL

## 2025-04-14 ASSESSMENT — EXTERNAL EXAM - LEFT EYE: OS_EXAM: NORMAL

## 2025-04-14 ASSESSMENT — TONOMETRY
OD_IOP_MMHG: 14
IOP_METHOD: TONOPEN
OS_IOP_MMHG: 18

## 2025-04-14 ASSESSMENT — EXTERNAL EXAM - RIGHT EYE: OD_EXAM: NORMAL

## 2025-04-14 NOTE — LETTER
April 14, 2025    Torres Solomon DO  [address]  [facility], [address]  [facility] [address]     Patient: Supa Márquez   YOB: 1995   Date of Visit: 4/14/2025       Dear Dr. Torres Solomon DO:    Thank you for referring Supa Márquez to me for evaluation. Here is my assessment and plan of care:    Assessment/Plan:  Diagnoses and all orders for this visit:  Keratoconus of both eyes  -Discussed the risk of progression and counseled against eye rubbing as this may lead to progression  -Discused corneal cross linking as a measure to prevent further progression of KCN  -Discussed the postop course, fluctuation of vision and the need for vision correction after CXL best with CL  -Discussed r/b/a of CXL including infection and haze.   -The patient understands and wishes to proceed  - Explained to the patient at length how CXL is important to decrease the risk of needing a corneal transplant later in life  - Explained that CXL will not improve vision  - Answered all pt questions  - Pentacam done today shows OU are stable c/w last year scan. However, OD is definitely more advanced than OS and pt is still young and at risk for progression, and I recommend doing CXL OD. The pt agrees  He would also like to see optom for CL  Also, discussed CTAK as another vision correction option  -Will schedule CXL (right eye)  - Will monitor OS for now  -     Corneal Topography - OU - Both Eyes  SCOT (obstructive sleep apnea)  -     Referral to Adult Sleep Medicine; Future      Below you will find my full exam findings. If you have questions, please do not hesitate to call me. I look forward to following Supa along with you.         Sincerely,        Barbie Salmeron MD        CC:   No Recipients        Base Eye Exam       Visual Acuity (Snellen - Linear)         Right Left    Dist cc 20/50-1 20/20-1    Dist ph cc 20/40-1               Tonometry (Tonopen, 8:44 AM)         Right Left    Pressure 14 18               Pupils         Pupils    Right PERRL, No APD    Left PERRL, No APD              Visual Fields         Left Right     Full Full              Extraocular Movement         Right Left     Full Full              Neuro/Psych       Oriented x3: Yes    Mood/Affect: Normal              Dilation       Both eyes: 1.0% Mydriacyl @ 8:46 AM                  Slit Lamp and Fundus Exam       External Exam         Right Left    External Normal Normal              Slit Lamp Exam         Right Left    Lids/Lashes Normal Normal    Conjunctiva/Sclera White and quiet White and quiet    Cornea Clear Clear    Anterior Chamber Deep and quiet Deep and quiet    Iris Round and reactive Round and reactive    Lens Clear Clear    Anterior Vitreous Normal Normal              Fundus Exam         Right Left    Disc Normal Normal    C/D Ratio 0.2 0.2    Macula Normal Normal    Vessels Normal Normal    Periphery Normal Normal

## 2025-04-14 NOTE — PROGRESS NOTES
Assessment/Plan   Diagnoses and all orders for this visit:  Keratoconus of both eyes  -Discussed the risk of progression and counseled against eye rubbing as this may lead to progression  -Discused corneal cross linking as a measure to prevent further progression of KCN  -Discussed the postop course, fluctuation of vision and the need for vision correction after CXL best with CL  -Discussed r/b/a of CXL including infection and haze.   -The patient understands and wishes to proceed  - Explained to the patient at length how CXL is important to decrease the risk of needing a corneal transplant later in life  - Explained that CXL will not improve vision  - Answered all pt questions  - Pentacam done today shows OU are stable c/w last year scan. However, OD is definitely more advanced than OS and pt is still young and at risk for progression, and I recommend doing CXL OD. The pt agrees  He would also like to see optom for CL  Also, discussed CTAK as another vision correction option  -Will schedule CXL (right eye)  - Will monitor OS for now  -     Corneal Topography - OU - Both Eyes

## 2025-04-17 ENCOUNTER — APPOINTMENT (OUTPATIENT)
Dept: OPHTHALMOLOGY | Facility: CLINIC | Age: 30
End: 2025-04-17
Payer: OTHER GOVERNMENT

## 2025-05-14 ENCOUNTER — APPOINTMENT (OUTPATIENT)
Dept: PODIATRY | Facility: CLINIC | Age: 30
End: 2025-05-14
Payer: OTHER GOVERNMENT

## 2025-05-28 ENCOUNTER — APPOINTMENT (OUTPATIENT)
Dept: DERMATOLOGY | Facility: CLINIC | Age: 30
End: 2025-05-28
Payer: OTHER GOVERNMENT

## 2025-05-28 DIAGNOSIS — B07.0 PLANTAR WART: Primary | ICD-10-CM

## 2025-05-28 DIAGNOSIS — D22.5 MELANOCYTIC NEVI OF TRUNK: ICD-10-CM

## 2025-05-28 DIAGNOSIS — D22.72 MELANOCYTIC NEVUS OF LEFT LOWER EXTREMITY: ICD-10-CM

## 2025-05-28 DIAGNOSIS — D22.30 MELANOCYTIC NEVI OF FACE: ICD-10-CM

## 2025-05-28 DIAGNOSIS — Z12.83 ENCOUNTER FOR SCREENING FOR MALIGNANT NEOPLASM OF SKIN: ICD-10-CM

## 2025-05-28 DIAGNOSIS — D22.4 MELANOCYTIC NEVI OF SCALP AND NECK: ICD-10-CM

## 2025-05-28 DIAGNOSIS — D22.60 MELANOCYTIC NEVI OF UNSPECIFIED UPPER LIMB, INCLUDING SHOULDER: ICD-10-CM

## 2025-05-28 DIAGNOSIS — L57.8 PHOTOAGING OF SKIN: ICD-10-CM

## 2025-05-28 DIAGNOSIS — D22.71 MELANOCYTIC NEVI OF RIGHT LOWER LIMB, INCLUDING HIP: ICD-10-CM

## 2025-05-28 PROCEDURE — 1036F TOBACCO NON-USER: CPT | Performed by: DERMATOLOGY

## 2025-05-28 PROCEDURE — 99203 OFFICE O/P NEW LOW 30 MIN: CPT | Performed by: DERMATOLOGY

## 2025-05-28 ASSESSMENT — DERMATOLOGY QUALITY OF LIFE (QOL) ASSESSMENT
RATE HOW BOTHERED YOU ARE BY SYMPTOMS OF YOUR SKIN PROBLEM (EG, ITCHING, STINGING BURNING, HURTING OR SKIN IRRITATION): 0 - NEVER BOTHERED
RATE HOW BOTHERED YOU ARE BY EFFECTS OF YOUR SKIN PROBLEMS ON YOUR ACTIVITIES (EG, GOING OUT, ACCOMPLISHING WHAT YOU WANT, WORK ACTIVITIES OR YOUR RELATIONSHIPS WITH OTHERS): 0 - NEVER BOTHERED
RATE HOW EMOTIONALLY BOTHERED YOU ARE BY YOUR SKIN PROBLEM (FOR EXAMPLE, WORRY, EMBARRASSMENT, FRUSTRATION): 0 - NEVER BOTHERED
RATE HOW EMOTIONALLY BOTHERED YOU ARE BY YOUR SKIN PROBLEM (FOR EXAMPLE, WORRY, EMBARRASSMENT, FRUSTRATION): 0 - NEVER BOTHERED
RATE HOW BOTHERED YOU ARE BY EFFECTS OF YOUR SKIN PROBLEMS ON YOUR ACTIVITIES (EG, GOING OUT, ACCOMPLISHING WHAT YOU WANT, WORK ACTIVITIES OR YOUR RELATIONSHIPS WITH OTHERS): 0 - NEVER BOTHERED
RATE HOW BOTHERED YOU ARE BY SYMPTOMS OF YOUR SKIN PROBLEM (EG, ITCHING, STINGING BURNING, HURTING OR SKIN IRRITATION): 0 - NEVER BOTHERED

## 2025-05-28 ASSESSMENT — PATIENT GLOBAL ASSESSMENT (PGA): WHAT IS THE PGA: PATIENT GLOBAL ASSESSMENT:  1 - CLEAR

## 2025-05-28 NOTE — LETTER
"May 28, 2025     Torres Solomon DO  [address]  [facility], [address]  [facility] [address]    Patient: Supa Márquez   YOB: 1995   Date of Visit: 5/28/2025       Dear Dr. Torres Solomon DO:    Thank you for referring Supa Márquez to me for evaluation. Below are my notes for this consultation.  If you have questions, please do not hesitate to call me. I look forward to following your patient along with you.       Sincerely,     Ayse Pat DO      CC: No Recipients  ______________________________________________________________________________________    Subjective    Pascual Márquez \"Supa\" is a 30 y.o. male who presents for the following: Skin Check (Pt here for FBSE. No hx or fhx of skin cancer. ) and Wart (Pt has wart on foot. He has tried otc products not helpful.).     Intake Questions  Do you have any new or changing Lesions?: (Patient-Rptd) (P) No  Are you an organ transplant recipient?: (Patient-Rptd) (P) No  Have you had or do you have a Staph Infection?: (Patient-Rptd) (P) No  Have you had or do you have Vacular Disease?: (Patient-Rptd) (P) No  Do you use sunscreen?: (Patient-Rptd) (P) Occasionally  Do you use a tanning bed?: (Patient-Rptd) (P) No    Review of Systems:  No other skin or systemic complaints other than what is documented elsewhere in the note.    The following portions of the chart were reviewed this encounter and updated as appropriate:         Skin Cancer History  Biopsy Log Book  No skin cancers from Specimen Tracking.    Additional History      Specialty Problems    None       Objective  Well appearing patient in no apparent distress; mood and affect are within normal limits.    A full examination was performed including scalp, head, eyes, ears, nose, lips, neck, chest, axillae, abdomen, back, buttocks, bilateral upper extremities, bilateral lower extremities, hands, feet, fingers, toes, fingernails, and toenails. Patient declined genital and " gluteal cleft exam. All findings within normal limits unless otherwise noted below.    Assessment/Plan  Skin Exam  1. PLANTAR WART  Left 2nd Metatarsal Plantar Area  Hyperkeratotic papule with a few small thrombosed vessels  Warts are common growths that can appear anywhere on the body and are due to the HPV virus. There are many treatment options available, however if the lesions are asymptomatic they do not have to be treated.  Treatments include liquid nitrogen (LN&2), electrodesiccation & curettage (ED&C), laser, topical prescription or over-the counter products. Regardless of treatment chosen, warts often require multiple treatments and may recur after therapy.    Discussed risks and benefits of LN2, ED&C, candida injection, laser treatment, Over-the-counter treatments and observation.     Patient elected for paring the lesion  only today, performed as courtesy    He is going to podiatry for this tomorrow, and will discuss further treatment with them and prefers to follow with them due to closer home.  Destr of lesion - Left 2nd Metatarsal Plantar Area  Complexity: simple    Destruction method comment:  15 blade  Informed consent: discussed and consent obtained    Procedure prep:  Patient prepped in sterile fashion  Outcome: patient tolerated procedure well with no complications    Post-procedure details: wound care instructions given    2. PHOTOAGING OF SKIN  Generalized  brown reticular macules in sun exposed areas of the body.  The risk of chronic, cumulative sun damage and risk of development of skin cancer was reviewed today.   The importance of sun protection was reviewed: including the use of a broad spectrum sunscreen of at least SPF 30 that protects against both UVA/UVB rays, with ingredients such as Zinc oxide or titanium dioxide, wearing sun protective clothing and sun avoidance. We reviewed the warning signs of non-melanoma skin cancer and ABCDEs of melanoma  Please follow up should you notice any  new or changing pre-existing skin lesion.  3. MELANOCYTIC NEVI OF FACE  Head - Anterior (Face)  Brown symmetric macules and papules  Clinically benign appearing nevi, no treatment is necessary.  The importance of sun protection was reviewed: including the use of a broad spectrum sunscreen that protects against both UVA/UVB rays, with ingredients such as Zinc oxide or titanium dioxide, wearing sun protective clothing and sun avoidance.   ABCDEs of melanoma reviewed.  Please follow up should you notice any new or changing pre-existing skin lesion.    4. MELANOCYTIC NEVI OF UNSPECIFIED UPPER LIMB, INCLUDING SHOULDER (2)  Left Arm, Right Arm  Scattered, uniform and benign-appearing, regular brown melanocytic papules and macules.  Clinically benign appearing nevi, no treatment is necessary.  The importance of sun protection was reviewed: including the use of a broad spectrum sunscreen that protects against both UVA/UVB rays, with ingredients such as Zinc oxide or titanium dioxide, wearing sun protective clothing and sun avoidance.   ABCDEs of melanoma reviewed.  Please follow up should you notice any new or changing pre-existing skin lesion.  5. MELANOCYTIC NEVI OF TRUNK (3)  Abdomen (Lower Torso, Anterior), Chest (Upper Torso, Anterior), Torso - Posterior (Back)  Tan-brown symmetric macules and papules  Clinically benign appearing nevi, no treatment is necessary.  The importance of sun protection was reviewed: including the use of a broad spectrum sunscreen that protects against both UVA/UVB rays, with ingredients such as Zinc oxide or titanium dioxide, wearing sun protective clothing and sun avoidance.   ABCDEs of melanoma reviewed.  Please follow up should you notice any new or changing pre-existing skin lesion.  6. MELANOCYTIC NEVI OF SCALP AND NECK  Neck - Anterior  Scattered, uniform and benign-appearing, regular brown melanocytic papules and macules.  Clinically benign appearing nevi, no treatment is  necessary.  The importance of sun protection was reviewed: including the use of a broad spectrum sunscreen that protects against both UVA/UVB rays, with ingredients such as Zinc oxide or titanium dioxide, wearing sun protective clothing and sun avoidance.   ABCDEs of melanoma reviewed.  Please follow up should you notice any new or changing pre-existing skin lesion.  7. MELANOCYTIC NEVUS OF LEFT LOWER EXTREMITY  Left Leg  Scattered, uniform and benign-appearing, regular brown melanocytic papules and macules.  Clinically benign appearing nevi, no treatment is necessary.  The importance of sun protection was reviewed: including the use of a broad spectrum sunscreen that protects against both UVA/UVB rays, with ingredients such as Zinc oxide or titanium dioxide, wearing sun protective clothing and sun avoidance.   ABCDEs of melanoma reviewed.  Please follow up should you notice any new or changing pre-existing skin lesion.  8. MELANOCYTIC NEVI OF RIGHT LOWER LIMB, INCLUDING HIP  Right Leg  Scattered, uniform and benign-appearing, regular brown melanocytic papules and macules.  Clinically benign appearing nevi, no treatment is necessary.  The importance of sun protection was reviewed: including the use of a broad spectrum sunscreen that protects against both UVA/UVB rays, with ingredients such as Zinc oxide or titanium dioxide, wearing sun protective clothing and sun avoidance.   ABCDEs of melanoma reviewed.  Please follow up should you notice any new or changing pre-existing skin lesion.  9. ENCOUNTER FOR SCREENING FOR MALIGNANT NEOPLASM OF SKIN  Generalized  No suspicious lesions noted on examination today  The risk of chronic, cumulative sun damage and risk of development of skin cancer was reviewed today.   The importance of sun protection was reviewed: including the use of a broad spectrum sunscreen of at least SPF 30 that protects against both UVA/UVB rays, with ingredients such as Zinc oxide or titanium dioxide,  wearing sun protective clothing and sun avoidance. We reviewed the warning signs of non-melanoma skin cancer and ABCDEs of melanoma  Please follow up should you notice any new or changing pre-existing skin lesion.    Follow up in 1-2 years for a full body skin exam    Price Byrne MD  PGY-2, Dermatology    I saw and evaluated the patient. I personally obtained the key and critical portions of the history and physical exam or was physically present for key and critical portions performed by the resident/fellow. I reviewed the resident/fellow's documentation and discussed the patient with the resident/fellow. I agree with the resident/fellow's medical decision making as documented in the note.    Ayse Pat, DO

## 2025-05-28 NOTE — Clinical Note
Warts are common growths that can appear anywhere on the body and are due to the HPV virus. There are many treatment options available, however if the lesions are asymptomatic they do not have to be treated.  Treatments include liquid nitrogen (LN&2), electrodesiccation & curettage (ED&C), laser, topical prescription or over-the counter products. Regardless of treatment chosen, warts often require multiple treatments and may recur after therapy.    Discussed risks and benefits of LN2, ED&C, candida injection, laser treatment, Over-the-counter treatments and observation.     Patient elected for paring the lesion  only today, performed as courtesy    He is going to podiatry for this tomorrow, and will discuss further treatment with them and prefers to follow with them due to closer home.

## 2025-05-28 NOTE — PROGRESS NOTES
"Derek Márquez \"Supa\" is a 30 y.o. male who presents for the following: Skin Check (Pt here for FBSE. No hx or fhx of skin cancer. ) and Wart (Pt has wart on foot. He has tried otc products not helpful.).     Intake Questions  Do you have any new or changing Lesions?: (Patient-Rptd) (P) No  Are you an organ transplant recipient?: (Patient-Rptd) (P) No  Have you had or do you have a Staph Infection?: (Patient-Rptd) (P) No  Have you had or do you have Vacular Disease?: (Patient-Rptd) (P) No  Do you use sunscreen?: (Patient-Rptd) (P) Occasionally  Do you use a tanning bed?: (Patient-Rptd) (P) No    Review of Systems:  No other skin or systemic complaints other than what is documented elsewhere in the note.    The following portions of the chart were reviewed this encounter and updated as appropriate:         Skin Cancer History  Biopsy Log Book  No skin cancers from Specimen Tracking.    Additional History      Specialty Problems    None       Objective   Well appearing patient in no apparent distress; mood and affect are within normal limits.    A full examination was performed including scalp, head, eyes, ears, nose, lips, neck, chest, axillae, abdomen, back, buttocks, bilateral upper extremities, bilateral lower extremities, hands, feet, fingers, toes, fingernails, and toenails. Patient declined genital and gluteal cleft exam. All findings within normal limits unless otherwise noted below.    Assessment/Plan   Skin Exam  1. PLANTAR WART  Left 2nd Metatarsal Plantar Area  Hyperkeratotic papule with a few small thrombosed vessels  Warts are common growths that can appear anywhere on the body and are due to the HPV virus. There are many treatment options available, however if the lesions are asymptomatic they do not have to be treated.  Treatments include liquid nitrogen (LN&2), electrodesiccation & curettage (ED&C), laser, topical prescription or over-the counter products. Regardless of treatment " chosen, warts often require multiple treatments and may recur after therapy.    Discussed risks and benefits of LN2, ED&C, candida injection, laser treatment, Over-the-counter treatments and observation.     Patient elected for paring the lesion  only today, performed as courtesy    He is going to podiatry for this tomorrow, and will discuss further treatment with them and prefers to follow with them due to closer home.  Destr of lesion - Left 2nd Metatarsal Plantar Area  Complexity: simple    Destruction method comment:  15 blade  Informed consent: discussed and consent obtained    Procedure prep:  Patient prepped in sterile fashion  Outcome: patient tolerated procedure well with no complications    Post-procedure details: wound care instructions given    2. PHOTOAGING OF SKIN  Generalized  brown reticular macules in sun exposed areas of the body.  The risk of chronic, cumulative sun damage and risk of development of skin cancer was reviewed today.   The importance of sun protection was reviewed: including the use of a broad spectrum sunscreen of at least SPF 30 that protects against both UVA/UVB rays, with ingredients such as Zinc oxide or titanium dioxide, wearing sun protective clothing and sun avoidance. We reviewed the warning signs of non-melanoma skin cancer and ABCDEs of melanoma  Please follow up should you notice any new or changing pre-existing skin lesion.  3. MELANOCYTIC NEVI OF FACE  Head - Anterior (Face)  Brown symmetric macules and papules  Clinically benign appearing nevi, no treatment is necessary.  The importance of sun protection was reviewed: including the use of a broad spectrum sunscreen that protects against both UVA/UVB rays, with ingredients such as Zinc oxide or titanium dioxide, wearing sun protective clothing and sun avoidance.   ABCDEs of melanoma reviewed.  Please follow up should you notice any new or changing pre-existing skin lesion.    4. MELANOCYTIC NEVI OF UNSPECIFIED UPPER  LIMB, INCLUDING SHOULDER (2)  Left Arm, Right Arm  Scattered, uniform and benign-appearing, regular brown melanocytic papules and macules.  Clinically benign appearing nevi, no treatment is necessary.  The importance of sun protection was reviewed: including the use of a broad spectrum sunscreen that protects against both UVA/UVB rays, with ingredients such as Zinc oxide or titanium dioxide, wearing sun protective clothing and sun avoidance.   ABCDEs of melanoma reviewed.  Please follow up should you notice any new or changing pre-existing skin lesion.  5. MELANOCYTIC NEVI OF TRUNK (3)  Abdomen (Lower Torso, Anterior), Chest (Upper Torso, Anterior), Torso - Posterior (Back)  Tan-brown symmetric macules and papules  Clinically benign appearing nevi, no treatment is necessary.  The importance of sun protection was reviewed: including the use of a broad spectrum sunscreen that protects against both UVA/UVB rays, with ingredients such as Zinc oxide or titanium dioxide, wearing sun protective clothing and sun avoidance.   ABCDEs of melanoma reviewed.  Please follow up should you notice any new or changing pre-existing skin lesion.  6. MELANOCYTIC NEVI OF SCALP AND NECK  Neck - Anterior  Scattered, uniform and benign-appearing, regular brown melanocytic papules and macules.  Clinically benign appearing nevi, no treatment is necessary.  The importance of sun protection was reviewed: including the use of a broad spectrum sunscreen that protects against both UVA/UVB rays, with ingredients such as Zinc oxide or titanium dioxide, wearing sun protective clothing and sun avoidance.   ABCDEs of melanoma reviewed.  Please follow up should you notice any new or changing pre-existing skin lesion.  7. MELANOCYTIC NEVUS OF LEFT LOWER EXTREMITY  Left Leg  Scattered, uniform and benign-appearing, regular brown melanocytic papules and macules.  Clinically benign appearing nevi, no treatment is necessary.  The importance of sun protection  was reviewed: including the use of a broad spectrum sunscreen that protects against both UVA/UVB rays, with ingredients such as Zinc oxide or titanium dioxide, wearing sun protective clothing and sun avoidance.   ABCDEs of melanoma reviewed.  Please follow up should you notice any new or changing pre-existing skin lesion.  8. MELANOCYTIC NEVI OF RIGHT LOWER LIMB, INCLUDING HIP  Right Leg  Scattered, uniform and benign-appearing, regular brown melanocytic papules and macules.  Clinically benign appearing nevi, no treatment is necessary.  The importance of sun protection was reviewed: including the use of a broad spectrum sunscreen that protects against both UVA/UVB rays, with ingredients such as Zinc oxide or titanium dioxide, wearing sun protective clothing and sun avoidance.   ABCDEs of melanoma reviewed.  Please follow up should you notice any new or changing pre-existing skin lesion.  9. ENCOUNTER FOR SCREENING FOR MALIGNANT NEOPLASM OF SKIN  Generalized  No suspicious lesions noted on examination today  The risk of chronic, cumulative sun damage and risk of development of skin cancer was reviewed today.   The importance of sun protection was reviewed: including the use of a broad spectrum sunscreen of at least SPF 30 that protects against both UVA/UVB rays, with ingredients such as Zinc oxide or titanium dioxide, wearing sun protective clothing and sun avoidance. We reviewed the warning signs of non-melanoma skin cancer and ABCDEs of melanoma  Please follow up should you notice any new or changing pre-existing skin lesion.    Follow up in 1-2 years for a full body skin exam    Price Byrne MD  PGY-2, Dermatology    I saw and evaluated the patient. I personally obtained the key and critical portions of the history and physical exam or was physically present for key and critical portions performed by the resident/fellow. I reviewed the resident/fellow's documentation and discussed the patient with the  resident/fellow. I agree with the resident/fellow's medical decision making as documented in the note.    Ayse Pat, DO

## 2025-05-28 NOTE — Clinical Note
Airway  Urgency: elective    Airway not difficult    General Information and Staff    Patient location during procedure: OR  CRNA: ENE KRUSE    Indications and Patient Condition  Indications for airway management: airway protection    Preoxygenated: yes  Mask difficulty assessment: 0 - not attempted    Final Airway Details  Final airway type: supraglottic airway      Successful airway: classic  Size 3    Number of attempts at approach: 1    Additional Comments  Airway pressure leak at <20cm/h20             Clinically benign appearing nevi, no treatment is necessary.  The importance of sun protection was reviewed: including the use of a broad spectrum sunscreen that protects against both UVA/UVB rays, with ingredients such as Zinc oxide or titanium dioxide, wearing sun protective clothing and sun avoidance.   ABCDEs of melanoma reviewed.  Please follow up should you notice any new or changing pre-existing skin lesion.

## 2025-05-28 NOTE — Clinical Note
Clinically benign appearing nevi, no treatment is necessary.  The importance of sun protection was reviewed: including the use of a broad spectrum sunscreen that protects against both UVA/UVB rays, with ingredients such as Zinc oxide or titanium dioxide, wearing sun protective clothing and sun avoidance.   ABCDEs of melanoma reviewed.  Please follow up should you notice any new or changing pre-existing skin lesion.

## 2025-05-29 ENCOUNTER — APPOINTMENT (OUTPATIENT)
Dept: PODIATRY | Facility: CLINIC | Age: 30
End: 2025-05-29
Payer: OTHER GOVERNMENT

## 2025-05-29 DIAGNOSIS — L29.9 ITCHING: ICD-10-CM

## 2025-05-29 DIAGNOSIS — B07.0 PLANTAR WARTS: Primary | ICD-10-CM

## 2025-05-29 DIAGNOSIS — M72.2 PLANTAR FASCIITIS: ICD-10-CM

## 2025-05-29 PROCEDURE — 99203 OFFICE O/P NEW LOW 30 MIN: CPT | Performed by: PODIATRIST

## 2025-05-29 PROCEDURE — 1036F TOBACCO NON-USER: CPT | Performed by: PODIATRIST

## 2025-05-29 RX ORDER — FLUOROURACIL 50 MG/G
CREAM TOPICAL 2 TIMES DAILY
Qty: 40 G | Refills: 0 | Status: SHIPPED | OUTPATIENT
Start: 2025-05-29 | End: 2025-06-28

## 2025-05-29 NOTE — PROGRESS NOTES
Name: Pascual Márquez  MRN: [id number]  Date of Service: May 29, 2025     CC: This 30 y.o. male patient presents to the clinic c/o wart on the left foot. Testing for plantar fascitis.     PCP Torres Solomon DO  Last visit 2/24/25    HPI: Patient states that he has noticed lesions on the left foot for the past 2 years.  He states that they have noticed that they are not enlarging with time.  There is  pain with ambulation and/or pressure over the area.  Patient states that they have tried over-the-counter for treatment and have noticed no improvement.  Patient complains of heel pain.  This has been persistent for a number of years.  Patient is wearing power step inserts.  He states after standing for short periods of time he has intense pain.    Medical History[1]  Current Medications[2]   Allergies[3]    Surgical History[4]   Family History[5]    Social History     Socioeconomic History    Marital status:      Spouse name: Not on file    Number of children: Not on file    Years of education: Not on file    Highest education level: Not on file   Occupational History    Not on file   Tobacco Use    Smoking status: Never    Smokeless tobacco: Never   Substance and Sexual Activity    Alcohol use: Not Currently     Alcohol/week: 1.0 standard drink of alcohol     Types: 1 Standard drinks or equivalent per week     Comment: Maybe one a week. Less than 5 a month    Drug use: Never    Sexual activity: Yes     Partners: Female     Birth control/protection: Condom Male   Other Topics Concern    Not on file   Social History Narrative    Not on file     Social Drivers of Health     Financial Resource Strain: Not on file   Food Insecurity: No Food Insecurity (4/23/2024)    Hunger Vital Sign     Worried About Running Out of Food in the Last Year: Never true     Ran Out of Food in the Last Year: Never true   Transportation Needs: Not on file   Physical Activity: Not on file   Stress: Not on file   Social Connections: Not on  file   Intimate Partner Violence: Not on file   Housing Stability: Not on file        Objective:   Vasc: DP and PT pulses are palpable bilateral.  CFT is less than 3 seconds bilateral.  Skin temperature is warm to cool proximal to distal bilateral.      Neuro:  Light touch is intact to the foot bilateral.        Derm: Nails 1-5 bilateral are intact.  Skin is supple with normal texture and turgor noted.  Webspaces are clean, dry and intact bilateral.  Verruca noted to left foot with overlying hyperkeratosis, interruption of the skin lines, pain with lateral compression and pinpoint bleeding upon debridement.  Lesion(s) measures approximately 1 cm x 1 cm, located to plantar foot adjacent to first metatarsal head.      Ortho: The foot type is rectus off weight bearing.  There are no structural deformities noted.  Patient has some fat pad atrophy.  Patient does have pain at the insertion site of the medial band of plantar fascia bilateral.  Patient also has some medial longitudinal arch pain.    Assessment: Verruca left foot     Plan: Initial Podiatric Office Visit- the etiology of the patient's complaint along with treatment options were explained to the patient.  They chose the following treatment:   1. Discussed with the patient the options for treatment.  We discussed the nature of verrucas, that they can be resistant to treatment and that recurrence can occur.  Discussed the risk of scarring and pain with treatment.    2. Patient elects to proceed with cryo treatment.  We discussed the risks, benefits, and alternatives.  Hyperkeratosis overlying lesions was debrided. Home instructions were given.   3. RTC: 4-6 weeks to check progress.   4. Lesion(s) are excised with scalpel blade.  Cryo treatment is performed for 30 seconds x 3  5. Rx effudex  Patient was instructed to call immediately if questions, concerns, pain arise prior to next appt.  For patient's plantar fasciitis.  Patient is given stretching exercises to  perform.  He is informed he should not go barefoot anymore and should consider a purchase of recovery sandals.  Consider cortisone injections.  Consider custom fit orthotics.       [1]   Past Medical History:  Diagnosis Date    Herpes Cold sores since birth    Keratoconus     Migraine Once a month    Varicella Approx. 2001    Visual impairment Approx. decline in 2021   [2]   No current outpatient medications on file.     No current facility-administered medications for this visit.   [3] No Known Allergies  [4]   Past Surgical History:  Procedure Laterality Date    ADENOIDECTOMY  Approx. 2001    CIRCUMCISION, PRIMARY  1995    TONSILLECTOMY Bilateral     WISDOM TOOTH EXTRACTION  Approx. 2012   [5]   Family History  Problem Relation Name Age of Onset    No Known Problems Mother      Cancer Paternal Grandfather Dante Ventura     Kidney disease Brother Jacky Ventura

## 2025-06-24 ENCOUNTER — OFFICE VISIT (OUTPATIENT)
Dept: URGENT CARE | Age: 30
End: 2025-06-24
Payer: OTHER GOVERNMENT

## 2025-06-24 VITALS
HEIGHT: 68 IN | WEIGHT: 150 LBS | SYSTOLIC BLOOD PRESSURE: 138 MMHG | OXYGEN SATURATION: 98 % | RESPIRATION RATE: 18 BRPM | HEART RATE: 77 BPM | TEMPERATURE: 98.1 F | BODY MASS INDEX: 22.73 KG/M2 | DIASTOLIC BLOOD PRESSURE: 73 MMHG

## 2025-06-24 DIAGNOSIS — L23.7 POISON IVY DERMATITIS: Primary | ICD-10-CM

## 2025-06-24 RX ORDER — METHYLPREDNISOLONE 4 MG/1
TABLET ORAL
Qty: 21 TABLET | Refills: 0 | Status: SHIPPED | OUTPATIENT
Start: 2025-06-24 | End: 2025-06-30

## 2025-06-24 RX ORDER — TRIAMCINOLONE ACETONIDE 1 MG/G
CREAM TOPICAL
Qty: 15 G | Refills: 0 | Status: SHIPPED | OUTPATIENT
Start: 2025-06-24

## 2025-06-24 NOTE — PROGRESS NOTES
"Subjective   Patient ID: Pascual Márquez \"Supa\" is a 30 y.o. male. They present today with a chief complaint of Rash (2 days/Rash on legs and groin/Itching/R/o poison ivy).      History of Present Illness  Rash  Patient presents for evaluation of a rash involving the bilateral forearms, hands, lower legs, and groin. Rash started 2 days ago. Lesions are red and yellow, and raised in texture. Rash has changed over time. Rash is pruritic. Associated symptoms: none. Patient denies: arthralgia and fever. Patient has not had contacts with similar rash. Patient has had new exposures (poison ivy).                History provided by:  Patient and medical records  Rash          Past Medical History  Allergies as of 06/24/2025    (No Known Allergies)       Prescriptions Prior to Admission[1]     Current Medications[2]    Problem List[3]    Surgical History[4]     reports that he has never smoked. He has never used smokeless tobacco. He reports that he does not currently use alcohol after a past usage of about 1.0 standard drink of alcohol per week. He reports that he does not use drugs.    Review of Systems  As noted in HPI. ROS otherwise negative unless noted.       Objective    Vitals:    06/24/25 1911   BP: 138/73   BP Location: Right arm   Patient Position: Sitting   BP Cuff Size: Adult   Pulse: 77   Resp: 18   Temp: 36.7 °C (98.1 °F)   TempSrc: Oral   SpO2: 98%   Weight: 68 kg (150 lb)   Height: 1.727 m (5' 8\")     No LMP for male patient.    Physical Exam  Vitals and nursing note reviewed.   Constitutional:       General: He is not in acute distress.     Appearance: Normal appearance. He is not ill-appearing.   HENT:      Head: Atraumatic.   Cardiovascular:      Rate and Rhythm: Normal rate and regular rhythm.      Pulses: Normal pulses.      Heart sounds: Normal heart sounds.   Pulmonary:      Effort: Pulmonary effort is normal.      Breath sounds: Normal breath sounds.   Abdominal:      General: Bowel sounds are " normal.      Palpations: Abdomen is soft.   Skin:     General: Skin is warm and dry.      Capillary Refill: Capillary refill takes less than 2 seconds.      Findings: Rash present. Rash is papular and vesicular.      Comments: Localized erythema. Rash has yellow vesicles scattered t/o. Excoriation marks evident.    Neurological:      Mental Status: He is alert and oriented to person, place, and time.   Psychiatric:         Behavior: Behavior normal.           Procedures    Point of Care Test & Imaging Results from this visit  Results for orders placed or performed in visit on 04/14/25   Corneal Topography - OU - Both Eyes    Collection Time: 04/14/25  9:37 AM   Result Value Ref Range    Pach Thinnest (OS) 478 microns    Pach Thinnest (OD) 419 microns    K Max (OS) 47.9 diopters    K Max (OD) 61.4 diopters    SimK Flat (OS) 44.6 diopters    SimK Flat (OD) 47.4 diopters    SimK Steep (OS) 46.3 diopters    SimK Steep (OD) 53.1 diopters            Diagnostic study results (if any) were reviewed.  (If applicable) preliminary radiology reading: [none]    Assessment/Plan   Allergies, medications, history, and pertinent labs/EKGs/Imaging reviewed.        Medical Decision Making  Risks, benefits, and alternatives of the medications and treatment plan prescribed today were discussed, and patient expressed understanding. Plan follow up as discussed or as needed if any worsening symptoms or change in condition. Reinforced red flags including (but not limited to): severe or worsening pain; difficulty swallowing; stiff neck; shortness of breath; coughing or vomiting blood; chest pain; and new or increased fever are indications to go to the Emergency Department.  At time of discharge patient was clinically well-appearing and HDS for outpatient management. The patient and/or family was educated regarding diagnosis, supportive care, OTC and Rx medications. The patient and/or family was given the opportunity to ask questions prior to  discharge.  They verbalized understanding of my discussion of the plans for treatment, expected course, indications to return to  or seek further evaluation in ED, and the need for timely follow up as directed.   They were provided with a work/school excuse if requested. The after-visit summary was given to the patient and care instructions were reviewed with the patient. All questions were answered and the patient verbalized understanding of the plan of care for today.  Plan:  Recent visit notes reviewed  Meds as above  Increase clear fluids  Pcp follow up this week if not improving or worsening  ER visit anytime 24/7 for acute worsening or changing condition    Orders and Diagnoses  Diagnoses and all orders for this visit:  Poison ivy dermatitis  -     methylPREDNISolone (Medrol Dospak) 4 mg tablets; Follow schedule on package instructions  -     triamcinolone (Kenalog) 0.1 % cream; Apply to affected area 2 times daily as needed. 30g      Medical Admin Record      Follow Up Instructions  No follow-ups on file.    Patient disposition: Home  ROBERTO Mcmanus-CNP           [1] (Not in a hospital admission)   [2]   Current Outpatient Medications   Medication Sig Dispense Refill    fluorouracil (Efudex) 5 % cream cream Apply topically 2 times a day. (Patient not taking: Reported on 6/24/2025) 40 g 0    methylPREDNISolone (Medrol Dospak) 4 mg tablets Follow schedule on package instructions 21 tablet 0    triamcinolone (Kenalog) 0.1 % cream Apply to affected area 2 times daily as needed. 30g 15 g 0     No current facility-administered medications for this visit.   [3]   Patient Active Problem List  Diagnosis    Sensation of cold in finger    Vertigo    Screening for lead exposure    Low back pain    Raynaud's syndrome without gangrene   [4]   Past Surgical History:  Procedure Laterality Date    ADENOIDECTOMY  Approx. 2001    CIRCUMCISION, PRIMARY  1995    TONSILLECTOMY Bilateral     WISDOM TOOTH EXTRACTION   Approx. 2012

## 2025-07-10 ENCOUNTER — APPOINTMENT (OUTPATIENT)
Dept: DERMATOLOGY | Facility: CLINIC | Age: 30
End: 2025-07-10
Payer: OTHER GOVERNMENT

## 2025-07-24 ENCOUNTER — APPOINTMENT (OUTPATIENT)
Dept: OPHTHALMOLOGY | Age: 30
End: 2025-07-24
Payer: OTHER GOVERNMENT

## 2025-07-24 DIAGNOSIS — H18.603 KERATOCONUS OF BOTH EYES: Primary | ICD-10-CM

## 2025-07-24 ASSESSMENT — REFRACTION_MANIFEST
OS_SPHERE: +0.75
OD_AXIS: 180
OD_CYLINDER: -5.50
OD_SPHERE: -1.75
OS_CYLINDER: -1.75
OS_AXIS: 025

## 2025-07-24 ASSESSMENT — ENCOUNTER SYMPTOMS
MUSCULOSKELETAL NEGATIVE: 0
GASTROINTESTINAL NEGATIVE: 0
CONSTITUTIONAL NEGATIVE: 0
ENDOCRINE NEGATIVE: 0
NEUROLOGICAL NEGATIVE: 0
ALLERGIC/IMMUNOLOGIC NEGATIVE: 0
HEMATOLOGIC/LYMPHATIC NEGATIVE: 0
PSYCHIATRIC NEGATIVE: 0
CARDIOVASCULAR NEGATIVE: 0
RESPIRATORY NEGATIVE: 0
EYES NEGATIVE: 0

## 2025-07-24 ASSESSMENT — REFRACTION_CURRENTRX
OS_CYLINDER: SPHERE
OS_BASECURVE: 8.4
OS_SPHERE: PLANO
OD_DIAMETER: 16.0
OS_DIAMETER: 16.0
OD_CYLINDER: -0.75
OS_SPHERE: PLANO
OS_SPHERE: +4.75
OD_DIAMETER: 16.0
OS_CYLINDER: SPHERE
OS_DIAMETER: 16.0
OD_DIAMETER: 16.0
OD_AXIS: 105
OD_BASECURVE: 8.4
OS_AXIS: 050
OD_CYLINDER: SPHERE
OD_SPHERE: PLANO
OD_BASECURVE: 8.4
OS_DIAMETER: 16.0
OD_SPHERE: PLANO
OS_BASECURVE: 8.4
OS_CYLINDER: -1.00
OD_BASECURVE: 8.4
OD_SPHERE: +5.75
OD_CYLINDER: SPHERE
OS_BASECURVE: 8.4

## 2025-07-24 ASSESSMENT — VISUAL ACUITY
VA_OR_OD_CURRENT_RX: 20/20
VA_OR_OS_CURRENT_RX: 20/20
OS_CC: 20/20
VA_OR_OD_CURRENT_RX: 20/20
OD_CC: 20/50
METHOD: SNELLEN - LINEAR
CORRECTION_TYPE: GLASSES
VA_OR_OS_CURRENT_RX: 20/20
VA_OR_OD_CURRENT_RX: 20/25
VA_OR_OS_CURRENT_RX: 20/20

## 2025-07-24 ASSESSMENT — EXTERNAL EXAM - RIGHT EYE: OD_EXAM: NORMAL

## 2025-07-24 ASSESSMENT — SLIT LAMP EXAM - LIDS
COMMENTS: NORMAL
COMMENTS: NORMAL

## 2025-07-24 ASSESSMENT — EXTERNAL EXAM - LEFT EYE: OS_EXAM: NORMAL

## 2025-07-24 ASSESSMENT — CUP TO DISC RATIO
OD_RATIO: 0.2
OS_RATIO: 0.2

## 2025-07-24 NOTE — Clinical Note
[employer] VS lenses. I marked down in the chart the position of the lens markings to indicate the orientation of the lenses and the Rx that needs to be placed in the contact lenses. I already vertex adjusted. If you give the customer service these numbers and the position of the markings they'll be able to create a lens. Patient will need to  the lenses with an appointment. I put an appointment in place in 6 weeks. If the lenses come in sooner then you can book him sooner.

## 2025-07-24 NOTE — PROGRESS NOTES
[facility] OU. Scheduled for CXL OD.  Pentacam topographical analysis of the cornea revealed:  OD:simulated keratometric values  46.9/53.0  Kmax: 61.1  thinnest corneal pachymetry value 406 micron  posterior float deviation from normal +48 micron  OS:simulated keratometric values  45.1/47.0  Kmax: 48.1  thinnest corneal pachymetry value 468 micron  posterior float deviation from normal +9 micron  These values are indicative of corneal irregularity and likely contribute to reduced visual acuity.     [employer] VS fitting. VA corrects to 20/20 OD/OS with residual astigmatism lenses ordered Abn and PA    RTC for CL PUP and I and R will need to purchase saline.

## 2025-08-01 ENCOUNTER — APPOINTMENT (OUTPATIENT)
Dept: PODIATRY | Facility: CLINIC | Age: 30
End: 2025-08-01
Payer: OTHER GOVERNMENT

## 2025-08-01 DIAGNOSIS — M72.2 PLANTAR FASCIITIS: Primary | ICD-10-CM

## 2025-08-01 DIAGNOSIS — B07.0 PLANTAR WARTS: ICD-10-CM

## 2025-08-01 DIAGNOSIS — L29.9 ITCHING: ICD-10-CM

## 2025-08-01 PROCEDURE — 1036F TOBACCO NON-USER: CPT | Performed by: PODIATRIST

## 2025-08-01 PROCEDURE — L3000 FT INSERT UCB BERKELEY SHELL: HCPCS | Performed by: PODIATRIST

## 2025-08-01 NOTE — PROGRESS NOTES
Name: Pascual Márquez  Date of Service: August 1, 2025    CC: This 30 y.o. male presents for custom orthotics for bilateral feet  As well, he has a wart   PCP Torres Solomon DO  Last visit 2/24/25    HPI: Patient states that he has noticed lesions on the left foot for the past 2 years.  He states that they have noticed that they are not enlarging with time.  There is  pain with ambulation and/or pressure over the area.  Patient states that they have tried over-the-counter for treatment and have noticed no improvement.  Patient complains of heel pain.  This has been persistent for a number of years.  Patient is wearing power step inserts.  He states after standing for short periods of time he has intense pain.    Medical History[1]  Current Medications[2]   Allergies[3]    Surgical History[4]   Family History[5]    Social History     Socioeconomic History    Marital status:      Spouse name: Not on file    Number of children: Not on file    Years of education: Not on file    Highest education level: Not on file   Occupational History    Not on file   Tobacco Use    Smoking status: Never    Smokeless tobacco: Never   Substance and Sexual Activity    Alcohol use: Not Currently     Alcohol/week: 1.0 standard drink of alcohol     Types: 1 Standard drinks or equivalent per week     Comment: Maybe one a week. Less than 5 a month    Drug use: Never    Sexual activity: Yes     Partners: Female     Birth control/protection: Condom Male   Other Topics Concern    Not on file   Social History Narrative    Not on file     Social Drivers of Health     Financial Resource Strain: Not on file   Food Insecurity: No Food Insecurity (4/23/2024)    Hunger Vital Sign     Worried About Running Out of Food in the Last Year: Never true     Ran Out of Food in the Last Year: Never true   Transportation Needs: Not on file   Physical Activity: Not on file   Stress: Not on file   Social Connections: Not on file   Intimate Partner Violence:  Not on file   Housing Stability: Not on file        Objective:   Vasc: DP and PT pulses are palpable bilateral.  CFT is less than 3 seconds bilateral.  Skin temperature is warm to cool proximal to distal bilateral.      Neuro:  Light touch is intact to the foot bilateral.        Derm: Nails 1-5 bilateral are intact.  Skin is supple with normal texture and turgor noted.  Webspaces are clean, dry and intact bilateral.  Verruca noted to left foot with overlying hyperkeratosis, interruption of the skin lines, pain with lateral compression and pinpoint bleeding upon debridement.  Lesion(s) measures approximately 1 cm x 1 cm, located to plantar foot adjacent to first metatarsal head.      Ortho: The foot type is rectus off weight bearing.  There are no structural deformities noted.  Patient has some fat pad atrophy.  Patient does have pain at the insertion site of the medial band of plantar fascia bilateral.  Patient also has some medial longitudinal arch pain.    Assessment: Verruca left foot   Lesion(s) are excised with scalpel blade.  Cryo treatment is performed for 30 seconds x 3  Cont effudex    Plantar fasciitis   Today pt is casted for two pairs of orthotics, one for work and one pair for home.                  [1]   Past Medical History:  Diagnosis Date    Acne     Herpes Cold sores since birth    Keratoconus     Migraine Once a month    Plantar fasciitis Dec. 2024    Varicella Approx. 2001    Verruca 2021    Visual impairment Approx. decline in 2021   [2]   Current Outpatient Medications   Medication Sig Dispense Refill    triamcinolone (Kenalog) 0.1 % cream Apply to affected area 2 times daily as needed. 30g 15 g 0     No current facility-administered medications for this visit.   [3] No Known Allergies  [4]   Past Surgical History:  Procedure Laterality Date    ADENOIDECTOMY  Approx. 2001    CIRCUMCISION, PRIMARY  1995    TONSILLECTOMY Bilateral     WISDOM TOOTH EXTRACTION  Approx. 2012   [5]   Family  History  Problem Relation Name Age of Onset    No Known Problems Mother      Cancer Paternal Grandfather Dante Ventura     Kidney disease Brother Jacky Ventura     Kidney disease Brother Jacky Ventura

## 2025-08-18 ENCOUNTER — APPOINTMENT (OUTPATIENT)
Dept: OPHTHALMOLOGY | Facility: CLINIC | Age: 30
End: 2025-08-18
Payer: OTHER GOVERNMENT

## 2025-08-18 DIAGNOSIS — H18.603 KERATOCONUS OF BOTH EYES: Primary | ICD-10-CM

## 2025-08-18 PROCEDURE — 99070(U19) SCLERAFIL(U19): Performed by: OPTOMETRIST

## 2025-08-18 PROCEDURE — 99213 OFFICE O/P EST LOW 20 MIN: CPT | Performed by: OPTOMETRIST

## 2025-08-18 ASSESSMENT — CONF VISUAL FIELD
OD_INFERIOR_TEMPORAL_RESTRICTION: 0
OD_SUPERIOR_TEMPORAL_RESTRICTION: 0
OD_INFERIOR_NASAL_RESTRICTION: 0
OS_INFERIOR_TEMPORAL_RESTRICTION: 0
OD_SUPERIOR_NASAL_RESTRICTION: 0
OS_SUPERIOR_NASAL_RESTRICTION: 0
OS_SUPERIOR_TEMPORAL_RESTRICTION: 0
METHOD: COUNTING FINGERS
OS_NORMAL: 1
OD_NORMAL: 1
OS_INFERIOR_NASAL_RESTRICTION: 0

## 2025-08-18 ASSESSMENT — REFRACTION_CURRENTRX
OS_AXIS: 050
OS_DIAMETER: 16.0
OS_SPHERE: +4.75
OD_AXIS: 105
OD_DIAMETER: 16.0
OD_SPHERE: +5.75
OD_CYLINDER: -0.75
OS_BASECURVE: 8.4
OD_BASECURVE: 8.4
OS_CYLINDER: -1.00

## 2025-08-18 ASSESSMENT — VISUAL ACUITY
VA_OR_OS_CURRENT_RX: 20/20
METHOD: SNELLEN - LINEAR
OS_SC: 20/30
OD_SC: 20/60
OD_SC+: -1
VA_OR_OD_CURRENT_RX: 20/20

## 2025-08-18 ASSESSMENT — SLIT LAMP EXAM - LIDS
COMMENTS: NORMAL
COMMENTS: NORMAL

## 2025-08-18 ASSESSMENT — EXTERNAL EXAM - RIGHT EYE: OD_EXAM: NORMAL

## 2025-08-18 ASSESSMENT — EXTERNAL EXAM - LEFT EYE: OS_EXAM: NORMAL

## 2025-10-14 ENCOUNTER — APPOINTMENT (OUTPATIENT)
Dept: OPHTHALMOLOGY | Facility: CLINIC | Age: 30
End: 2025-10-14
Payer: OTHER GOVERNMENT